# Patient Record
Sex: FEMALE | Race: BLACK OR AFRICAN AMERICAN | NOT HISPANIC OR LATINO | Employment: FULL TIME | URBAN - METROPOLITAN AREA
[De-identification: names, ages, dates, MRNs, and addresses within clinical notes are randomized per-mention and may not be internally consistent; named-entity substitution may affect disease eponyms.]

---

## 2017-03-14 ENCOUNTER — CONVERSION ENCOUNTER (OUTPATIENT)
Dept: GENERAL RADIOLOGY | Facility: HOSPITAL | Age: 42
End: 2017-03-14

## 2018-01-04 ENCOUNTER — OFFICE VISIT CONVERTED (OUTPATIENT)
Dept: ORTHOPEDIC SURGERY | Facility: CLINIC | Age: 43
End: 2018-01-04
Attending: PHYSICIAN ASSISTANT

## 2018-01-25 ENCOUNTER — OFFICE VISIT CONVERTED (OUTPATIENT)
Dept: ORTHOPEDIC SURGERY | Facility: CLINIC | Age: 43
End: 2018-01-25
Attending: PHYSICIAN ASSISTANT

## 2018-02-22 ENCOUNTER — OFFICE VISIT CONVERTED (OUTPATIENT)
Dept: ORTHOPEDIC SURGERY | Facility: CLINIC | Age: 43
End: 2018-02-22
Attending: PHYSICIAN ASSISTANT

## 2018-03-05 ENCOUNTER — OFFICE VISIT CONVERTED (OUTPATIENT)
Dept: FAMILY MEDICINE CLINIC | Facility: CLINIC | Age: 43
End: 2018-03-05
Attending: FAMILY MEDICINE

## 2018-03-05 ENCOUNTER — CONVERSION ENCOUNTER (OUTPATIENT)
Dept: FAMILY MEDICINE CLINIC | Facility: CLINIC | Age: 43
End: 2018-03-05

## 2018-03-22 ENCOUNTER — OFFICE VISIT CONVERTED (OUTPATIENT)
Dept: ORTHOPEDIC SURGERY | Facility: CLINIC | Age: 43
End: 2018-03-22
Attending: PHYSICIAN ASSISTANT

## 2018-03-29 ENCOUNTER — CONVERSION ENCOUNTER (OUTPATIENT)
Dept: FAMILY MEDICINE CLINIC | Facility: CLINIC | Age: 43
End: 2018-03-29

## 2018-03-29 ENCOUNTER — OFFICE VISIT CONVERTED (OUTPATIENT)
Dept: FAMILY MEDICINE CLINIC | Facility: CLINIC | Age: 43
End: 2018-03-29
Attending: FAMILY MEDICINE

## 2018-04-19 ENCOUNTER — OFFICE VISIT CONVERTED (OUTPATIENT)
Dept: NEUROLOGY | Facility: CLINIC | Age: 43
End: 2018-04-19
Attending: PSYCHIATRY & NEUROLOGY

## 2018-05-01 ENCOUNTER — OFFICE VISIT CONVERTED (OUTPATIENT)
Dept: ORTHOPEDIC SURGERY | Facility: CLINIC | Age: 43
End: 2018-05-01
Attending: PHYSICIAN ASSISTANT

## 2018-06-07 ENCOUNTER — OFFICE VISIT CONVERTED (OUTPATIENT)
Dept: ORTHOPEDIC SURGERY | Facility: CLINIC | Age: 43
End: 2018-06-07
Attending: PHYSICIAN ASSISTANT

## 2018-10-30 ENCOUNTER — OFFICE VISIT CONVERTED (OUTPATIENT)
Dept: FAMILY MEDICINE CLINIC | Facility: CLINIC | Age: 43
End: 2018-10-30
Attending: FAMILY MEDICINE

## 2018-11-08 ENCOUNTER — CONVERSION ENCOUNTER (OUTPATIENT)
Dept: FAMILY MEDICINE CLINIC | Facility: CLINIC | Age: 43
End: 2018-11-08

## 2018-11-08 ENCOUNTER — OFFICE VISIT CONVERTED (OUTPATIENT)
Dept: FAMILY MEDICINE CLINIC | Facility: CLINIC | Age: 43
End: 2018-11-08
Attending: FAMILY MEDICINE

## 2018-12-05 ENCOUNTER — OFFICE VISIT CONVERTED (OUTPATIENT)
Dept: OTOLARYNGOLOGY | Facility: CLINIC | Age: 43
End: 2018-12-05
Attending: OTOLARYNGOLOGY

## 2018-12-05 ENCOUNTER — CONVERSION ENCOUNTER (OUTPATIENT)
Dept: GENERAL RADIOLOGY | Facility: HOSPITAL | Age: 43
End: 2018-12-05

## 2018-12-10 ENCOUNTER — OFFICE VISIT CONVERTED (OUTPATIENT)
Dept: GASTROENTEROLOGY | Facility: CLINIC | Age: 43
End: 2018-12-10
Attending: NURSE PRACTITIONER

## 2019-01-23 ENCOUNTER — OFFICE VISIT CONVERTED (OUTPATIENT)
Dept: GASTROENTEROLOGY | Facility: CLINIC | Age: 44
End: 2019-01-23
Attending: NURSE PRACTITIONER

## 2019-02-14 ENCOUNTER — OFFICE VISIT CONVERTED (OUTPATIENT)
Dept: OTOLARYNGOLOGY | Facility: CLINIC | Age: 44
End: 2019-02-14
Attending: OTOLARYNGOLOGY

## 2019-03-13 ENCOUNTER — HOSPITAL ENCOUNTER (OUTPATIENT)
Dept: URGENT CARE | Facility: CLINIC | Age: 44
Discharge: HOME | End: 2019-03-13

## 2019-03-25 ENCOUNTER — CONVERSION ENCOUNTER (OUTPATIENT)
Dept: OTOLARYNGOLOGY | Facility: CLINIC | Age: 44
End: 2019-03-25

## 2019-03-25 ENCOUNTER — OFFICE VISIT CONVERTED (OUTPATIENT)
Dept: OTOLARYNGOLOGY | Facility: CLINIC | Age: 44
End: 2019-03-25
Attending: OTOLARYNGOLOGY

## 2019-05-22 ENCOUNTER — OFFICE VISIT CONVERTED (OUTPATIENT)
Dept: GASTROENTEROLOGY | Facility: CLINIC | Age: 44
End: 2019-05-22
Attending: NURSE PRACTITIONER

## 2019-06-13 ENCOUNTER — HOSPITAL ENCOUNTER (OUTPATIENT)
Dept: GASTROENTEROLOGY | Facility: HOSPITAL | Age: 44
Setting detail: HOSPITAL OUTPATIENT SURGERY
Discharge: HOME | End: 2019-06-13
Attending: INTERNAL MEDICINE

## 2019-09-13 ENCOUNTER — HOSPITAL ENCOUNTER (OUTPATIENT)
Dept: OTHER | Facility: HOSPITAL | Age: 44
Discharge: HOME | End: 2019-09-13
Attending: NURSE PRACTITIONER

## 2019-12-15 ENCOUNTER — HOSPITAL ENCOUNTER (OUTPATIENT)
Dept: URGENT CARE | Facility: CLINIC | Age: 44
Discharge: HOME | End: 2019-12-15
Attending: PHYSICIAN ASSISTANT

## 2019-12-20 ENCOUNTER — HOSPITAL ENCOUNTER (OUTPATIENT)
Dept: OTHER | Facility: HOSPITAL | Age: 44
Discharge: HOME | End: 2019-12-20
Attending: NURSE PRACTITIONER

## 2020-10-15 ENCOUNTER — CONVERSION ENCOUNTER (OUTPATIENT)
Dept: FAMILY MEDICINE CLINIC | Facility: CLINIC | Age: 45
End: 2020-10-15
Attending: FAMILY MEDICINE

## 2020-12-30 ENCOUNTER — HOSPITAL ENCOUNTER (OUTPATIENT)
Dept: OTHER | Facility: HOSPITAL | Age: 45
Discharge: HOME | End: 2020-12-30
Attending: NURSE PRACTITIONER

## 2021-01-14 ENCOUNTER — HOSPITAL ENCOUNTER (OUTPATIENT)
Dept: OTHER | Facility: HOSPITAL | Age: 46
Discharge: HOME | End: 2021-01-14
Attending: NURSE PRACTITIONER

## 2021-05-07 NOTE — PROGRESS NOTES
Progress Note      Patient Name: Javier Lua   Patient ID: [id number]   Sex: Female   YOB: 1975        Visit Date: March 29, 2018    Provider: Brian Mckinney MD   Location: [facility]   Location Address: [address] [place] [address]  [id number]   Location Phone: [phone]          Chief Complaint     cough, sore throat, sinus pressure, drainage, headache, stopped up ears, eyes hurt and pressure, sneezing. Did get better from last sickness earlier in month and then it came back this tuesday.       History Of Present Illness  Javier Lua is a 42 year old /Black female who presents for evaluation and treatment of:      cold symptoms x 2 days ago- sudden onset- worsening symptoms- otc meds not helping       Past Medical History  Disease Name Date Onset Notes   GERD (gastroesophageal reflux disease) --  --    Hypothyroid --  --    Seasonal allergies --  --          Past Surgical History  Procedure Name Date Notes   Cholecstectomy --  --    Hysterectomy --  --    Hysterectomy (due to cancer) --  --    Knee surgery --  --          Medication List  Name Date Started Instructions   levothyroxine 137 mcg oral tablet  take 1 tablet (137 mcg) by oral route once daily   Zantac 150 mg oral tablet  take 1 tablet (150 mg) by oral route 2 times per day         Allergy List  Allergen Name Date Reaction Notes   NO KNOWN DRUG ALLERGIES --  --  --          Family Medical History  Disease Name Relative/Age Notes   Anemia / Mother    Mother/    Arthrtis / Mother; Grandmother (maternal); Grandfather (maternal)    Grandfather (maternal)/     Grandmother (maternal)/     Mother/    Asthma / Mother    Mother/    DM Type II / Grandfather (maternal)    Grandfather (maternal)/    Hypertension / Mother; Grandmother (maternal)    Grandmother (maternal)/     Mother/          Social History  Finding Status Start/Stop Quantity Notes   Alcohol Former --/-- --  drank alcohol  in the past, 7 or less drinks per week   Exercises regularly --  --/-- --  1-2 times per week   Recreational Drug Use Never --/-- --  never used   Second hand smoke exposure Unknown --/-- --  yes   Tobacco Never --/-- --  never smoker, never uses other tobacco products   Uses seatbelts --  --/-- --  yes         Immunizations  NameDate Admin Mfg Trade Name Lot Number Route Inj VIS Given VIS Publication   Zcyfttdat84/15/2017 UNK Unknown TradeName [id number] NE NE 03/29/2018 08/07/2015   Comments:          Review of Systems  · Constitutional  o Admits  o : fever  o Denies  o : fatigue  · HENT  o Admits  o : sinus pain, nasal congestion, nasal discharge, sore throat, ear pain  · Cardiovascular  o Denies  o : chest pain, palpitations  · Respiratory  o Admits  o : cough  o Denies  o : shortness of breath  · Gastrointestinal  o Denies  o : nausea, vomiting, diarrhea      Vitals  Date Time BP Position Site L\R Cuff Size HR RR TEMP(F) WT  HT  BMI kg/m2 BSA m2 O2 Sat        03/29/2018 06:03 /80 Sitting    88 - R  98.8 248lbs 6oz    99 %           Physical Examination  · Constitutional  o Appearance  o : well developed, well-nourished, in no acute distress  · Head and Face  o HEENT  o : bilateral maxillary sinus tenderness, erythema of throat, uvula midline, throat open, no abscesses seen  · Respiratory  o Respiratory Effort  o : breathing unlabored  o Auscultation of Lungs  o : clear to auscultation  · Cardiovascular  o Heart  o :   § Auscultation of Heart  § : regular rate and rhythm  · Skin and Subcutaneous Tissue  o General Inspection  o : normal tone  · Neurologic  o Mental Status Examination  o :   § Orientation  § : grossly oriented to person, place and time  · Psychiatric  o General  o : normal affect and calm          Assessment  · Maxillary sinusitis     473.0/J32.0      Plan  · Orders  o ACO-39: Current medications updated and reviewed () - - 03/29/2018  o Influenza immunization was not ordered or  administered for reasons documented by clinician () - - 03/29/2018   recieved previously  · Medications  o cefdinir 300 mg oral capsule   SIG: take 1 capsule (300 mg) by oral route every 12 hours for 7 days   DISP: (14) capsules with 0 refills  Prescribed on 03/29/2018     o Probiotic 20 billion cell oral capsule   SIG: take 1 capsule by oral route 2 times a day for 10 days   DISP: (20) capsules with 0 refills  Prescribed on 03/29/2018     o Singulair 10 mg oral tablet   SIG: take 1 tablet (10 mg) by oral route once daily in the evening for 30 days   DISP: (30) tablets with 1 refills  Prescribed on 03/29/2018     o Medrol (Guillermo) 4 mg oral tablets,dose pack   SIG: take as directed for 5 days   DISP: (1) 21 ct dose-pack with 0 refills  Prescribed on 03/29/2018     · Instructions  o Patient was educated/instructed on their diagnosis, treatment and medications prior to discharge from the clinic today.            Electronically Signed by: rBian Mckinney MD -Author on March 29, 2018 06:24:04 PM

## 2021-05-07 NOTE — PROGRESS NOTES
Progress Note      Patient Name: Javier Lua   Patient ID: [id number]   Sex: Female   YOB: 1975        Visit Date: November 8, 2018    Provider: Brian Mckinney MD   Location: [facility]   Location Address: [address] [place] [address]  [id number]   Location Phone: [phone]          Chief Complaint     Still having the ear and teeth pain. went to dentist yesterday and told teeth are good.       History Of Present Illness  Javier Lua is a 42 year old /Black female who presents for evaluation and treatment of:      following up for ear pain- right side- meds not helping- seen dentist- not teeth- no better- worsening symptoms       Past Medical History  Disease Name Date Onset Notes   GERD (gastroesophageal reflux disease) --  --    Hypothyroid --  --    Seasonal allergies --  --          Past Surgical History  Procedure Name Date Notes   Cholecstectomy --  --    Hysterectomy --  --    Hysterectomy (due to cancer) --  --    Knee surgery --  --          Medication List  Name Date Started Instructions   levothyroxine 137 mcg oral tablet  take 1 tablet (137 mcg) by oral route once daily   ofloxacin 0.3 % otic (ear) drops 10/30/2018 instill 10 drops (1.5 mg) into affected ear(s) by otic route 2 times per day for 7 days   Singulair 10 mg oral tablet 10/30/2018 take 1 tablet (10 mg) by oral route once daily in the evening for 30 days   Zantac 150 mg oral tablet  take 1 tablet (150 mg) by oral route 2 times per day         Allergy List  Allergen Name Date Reaction Notes   NO KNOWN DRUG ALLERGIES --  --  --          Family Medical History  Disease Name Relative/Age Notes   Anemia / Mother    Mother/    Arthrtis / Mother; Grandmother (maternal); Grandfather (maternal)    Grandfather (maternal)/     Grandmother (maternal)/     Mother/    Asthma / Mother    Mother/    DM Type II / Grandfather (maternal)    Grandfather (maternal)/    Hypertension /  "Mother; Grandmother (maternal)    Grandmother (maternal)/     Mother/          Social History  Finding Status Start/Stop Quantity Notes   Alcohol Former --/-- --  drank alcohol in the past, 7 or less drinks per week   Exercises regularly --  --/-- --  1-2 times per week   Recreational Drug Use Never --/-- --  never used   Second hand smoke exposure Unknown --/-- --  yes   Tobacco Never --/-- --  never smoker, never uses other tobacco products   Uses seatbelts --  --/-- --  yes         Immunizations  NameDate Admin Mfg Trade Name Lot Number Route Inj VIS Given VIS Publication   Pfbecpilq35/10/2018 [facility] Fluzone Quadrivalent [id number] IM LA 10/10/2018 08/07/2015   Comments: 47435-642-03   Odctlsnqb74/15/2017 UNK Unknown TradeName [id number] NE NE 03/29/2018 08/07/2015   Comments:          Review of Systems  · Constitutional  o Denies  o : fatigue, fever  · HENT  o Admits  o : ear pain  o Denies  o : sinus pain, nasal congestion, nasal discharge, sore throat  · Cardiovascular  o Denies  o : chest pain, palpitations  · Respiratory  o Denies  o : shortness of breath, cough  · Gastrointestinal  o Denies  o : nausea, vomiting, diarrhea      Vitals  Date Time BP Position Site L\R Cuff Size HR RR TEMP(F) WT  HT  BMI kg/m2 BSA m2 O2 Sat        11/08/2018 07:08 /74 Sitting    84 - R  97.6 251lbs 2oz 5'  5\" 41.79 2.29 97 %           Physical Examination  · Constitutional  o Appearance  o : well developed, well-nourished, in no acute distress  · Head and Face  o HEENT  o : erythema of throat, uvula midline, throat open, no abscesses seen, left TM clear, right TM slightly cloudy  · Respiratory  o Respiratory Effort  o : breathing unlabored  o Auscultation of Lungs  o : clear to auscultation  · Cardiovascular  o Heart  o :   § Auscultation of Heart  § : regular rate and rhythm  · Skin and Subcutaneous Tissue  o General Inspection  o : normal tone  · Neurologic  o Mental Status Examination  o :   § Orientation  § : grossly " oriented to person, place and time  · Psychiatric  o General  o : normal affect and calm          Assessment  · Hypothyroidism     244.9/E03.9  · Otitis media, right     382.9/H66.91      Plan  · Orders  o IOP - CBC (03848) - 382.9/H66.91 - 11/08/2018  o TSH [facility] (26879) - 382.9/H66.91, 244.9/E03.9 - 11/08/2018  o ACO-39: Current medications updated and reviewed () - - 11/08/2018  o ENT CONSULTATION ([facility]) - 382.9/H66.91 - 11/08/2018  · Medications  o clindamycin HCl 300 mg oral capsule   SIG: take 1 capsule (300 mg) by oral route every 6 hours for 7 days   DISP: (28) capsules with 0 refills  Prescribed on 11/08/2018     o Probiotic 20 billion cell oral capsule   SIG: take 1 capsule by oral route 2 times a day for 10 days   DISP: (20) capsules with 0 refills  Prescribed on 11/08/2018     o Medrol (Guillermo) 4 mg oral tablets,dose pack   SIG: take as directed for 5 days   DISP: (1) 21 ct dose-pack with 0 refills  Prescribed on 11/08/2018     o ofloxacin 0.3 % otic (ear) drops   SIG: instill 10 drops (1.5 mg) into affected ear(s) by otic route 2 times per day for 7 days   DISP: (1) 10 ml drop btl with 0 refills  Discontinued on 11/08/2018     · Instructions  o Patient was educated/instructed on their diagnosis, treatment and medications prior to discharge from the clinic today.            Electronically Signed by: Brian Mckinney MD -Author on November 8, 2018 07:59:15 PM

## 2021-05-07 NOTE — PROGRESS NOTES
Progress Note      Patient Name: Javier Lua   Patient ID: [id number]   Sex: Female   YOB: 1975        Visit Date: October 30, 2018    Provider: Brian Mckinney MD   Location: [facility]   Location Address: [address] [place] [address]  [id number]   Location Phone: [phone]          Chief Complaint     right ear pain off and on 2 weeks.       History Of Present Illness  Javier Lua is a 42 year old /Black female who presents for evaluation and treatment of:      cold symptoms x 2 weeks- sudden onset- worsening symptoms- right ear pain       Past Medical History  Disease Name Date Onset Notes   GERD (gastroesophageal reflux disease) --  --    Hypothyroid --  --    Seasonal allergies --  --          Past Surgical History  Procedure Name Date Notes   Cholecstectomy --  --    Hysterectomy --  --    Hysterectomy (due to cancer) --  --    Knee surgery --  --          Medication List  Name Date Started Instructions   levothyroxine 137 mcg oral tablet  take 1 tablet (137 mcg) by oral route once daily   Singulair 10 mg oral tablet 03/29/2018 take 1 tablet (10 mg) by oral route once daily in the evening for 30 days   Zantac 150 mg oral tablet  take 1 tablet (150 mg) by oral route 2 times per day         Allergy List  Allergen Name Date Reaction Notes   NO KNOWN DRUG ALLERGIES --  --  --          Family Medical History  Disease Name Relative/Age Notes   Anemia / Mother    Mother/    Arthrtis / Mother; Grandmother (maternal); Grandfather (maternal)    Grandfather (maternal)/     Grandmother (maternal)/     Mother/    Asthma / Mother    Mother/    DM Type II / Grandfather (maternal)    Grandfather (maternal)/    Hypertension / Mother; Grandmother (maternal)    Grandmother (maternal)/     Mother/          Social History  Finding Status Start/Stop Quantity Notes   Alcohol Former --/-- --  drank alcohol in the past, 7 or less drinks per week   Exercises  regularly --  --/-- --  1-2 times per week   Recreational Drug Use Never --/-- --  never used   Second hand smoke exposure Unknown --/-- --  yes   Tobacco Never --/-- --  never smoker, never uses other tobacco products   Uses seatbelts --  --/-- --  yes         Immunizations  NameDate Admin Mfg Trade Name Lot Number Route Inj VIS Given VIS Publication   Oexncwyuv76/10/2018 [facility] Fluzone Quadrivalent [id number] IM LA 10/10/2018 08/07/2015   Comments: 51831-832-24   Iaacqjxtz38/15/2017 UNK Unknown TradeName [id number] NE NE 03/29/2018 08/07/2015   Comments:          Review of Systems  · Constitutional  o Admits  o : fever  o Denies  o : fatigue  · HENT  o Admits  o : ear pain  o Denies  o : nasal congestion, nasal discharge, sore throat  · Cardiovascular  o Denies  o : chest pain, palpitations  · Respiratory  o Denies  o : shortness of breath, cough  · Integument  o Denies  o : rash      Vitals  Date Time BP Position Site L\R Cuff Size HR RR TEMP(F) WT  HT  BMI kg/m2 BSA m2 O2 Sat HC       10/30/2018 06:21 /68 Sitting    88 - R  97.8 252lbs 2oz    98 %           Physical Examination  · Constitutional  o Appearance  o : well developed, well-nourished, in no acute distress  · Ears, Nose, Mouth and Throat  o Ears  o :   § Otoscopic Examination  § : bilateral TM's cloudy, red  o Throat  o :   § Oropharynx  § : no erythema of throat  · Respiratory  o Respiratory Effort  o : breathing unlabored  o Auscultation of Lungs  o : clear to auscultation  · Cardiovascular  o Heart  o :   § Auscultation of Heart  § : regular rate and rhythm  · Skin and Subcutaneous Tissue  o General Inspection  o : normal tone  · Neurologic  o Mental Status Examination  o :   § Orientation  § : grossly oriented to person, place and time  · Psychiatric  o General  o : normal affect and calm          Assessment  · Otitis media     382.9/H66.90      Plan  · Orders  o ACO-39: Current medications updated and reviewed () - -  10/30/2018  · Medications  o cefdinir 300 mg oral capsule   SIG: take 1 capsule (300 mg) by oral route every 12 hours for 7 days   DISP: (14) capsules with 0 refills  Prescribed on 10/30/2018     o Probiotic 20 billion cell oral capsule   SIG: take 1 capsule by oral route 2 times a day for 10 days   DISP: (20) capsules with 0 refills  Prescribed on 10/30/2018     o ofloxacin 0.3 % otic (ear) drops   SIG: instill 10 drops (1.5 mg) into affected ear(s) by otic route 2 times per day for 7 days   DISP: (1) 10 ml drop btl with 0 refills  Prescribed on 10/30/2018     o Singulair 10 mg oral tablet   SIG: take 1 tablet (10 mg) by oral route once daily in the evening for 30 days   DISP: (30) tablets with 5 refills  Adjusted on 10/30/2018     · Instructions  o Patient was educated/instructed on their diagnosis, treatment and medications prior to discharge from the clinic today.            Electronically Signed by: Brian Mckinney MD -Author on October 30, 2018 06:29:56 PM

## 2021-05-07 NOTE — PROGRESS NOTES
Quick Note      Patient Name: Javier Lua   Patient ID: [id number]   Sex: Female   YOB: 1975        Visit Date: October 15, 2020    Provider: Paulino Hairston DO   Location: [facility]   Location Address: [address] [place] [address] [place]  [id number]   Location Phone: [phone]          History Of Present Illness  Javier Lua presents today for a flu vaccination. The 2538-2722. Seasonal Influenza (Flu) Vaccine Adminstration Record has been completed and scanned into the chart.           Assessment  · Need for immunization against influenza     V04.81/Z23      Plan  · Orders  o Immunization Admin Fee (Single) ([facility]) (32687) - V04.81/Z23 - 10/15/2020  o Fluarix QUADRIVALENT Vaccine, Syringe, Latex Free, Preservative Free, age 6 months and up (91958) - V04.81/Z23 - 10/15/2020   RT ARM LOT#[id number] EXP:6-30-21 NDC [id number]            Electronically Signed by: Oneyda Young MA -Author on October 15, 2020 10:57:44 AM

## 2021-05-07 NOTE — PROGRESS NOTES
Progress Note      Patient Name: Javier Lua   Patient ID: [id number]   Sex: Female   YOB: 1975        Visit Date: March 5, 2018    Provider: Brian Mckinney MD   Location: [facility]   Location Address: [address] [place] [address]  [id number]   Location Phone: [phone]          Chief Complaint     patient c/o a cough x 1 month       History Of Present Illness  Javier Lua is a 42 year old /Black female who presents for evaluation and treatment of:      cough x 1-2 months- sudden onset- worsening symptoms- otc meds not helping    xrays:NAD       Past Medical History  Disease Name Date Onset Notes   GERD (gastroesophageal reflux disease) --  --    Hypothyroid --  --    Seasonal allergies --  --          Past Surgical History  Procedure Name Date Notes   Cholecstectomy --  --    Hysterectomy --  --    Hysterectomy (due to cancer) --  --    Knee surgery --  --          Medication List  Name Date Started Instructions   levothyroxine 137 mcg oral tablet  take 1 tablet (137 mcg) by oral route once daily   Zantac 150 mg oral tablet  take 1 tablet (150 mg) by oral route 2 times per day         Allergy List  Allergen Name Date Reaction Notes   NO KNOWN DRUG ALLERGIES --  --  --          Family Medical History  Disease Name Relative/Age Notes   Anemia / Mother    Mother/    Arthrtis / Mother; Grandmother (maternal); Grandfather (maternal)    Grandfather (maternal)/     Grandmother (maternal)/     Mother/    Asthma / Mother    Mother/    DM Type II / Grandfather (maternal)    Grandfather (maternal)/    Hypertension / Mother; Grandmother (maternal)    Grandmother (maternal)/     Mother/          Social History  Finding Status Start/Stop Quantity Notes   Alcohol Former --/-- --  drank alcohol in the past, 7 or less drinks per week   Exercises regularly --  --/-- --  1-2 times per week   Recreational Drug Use Never --/-- --  never used   Second hand  smoke exposure Unknown --/-- --  yes   Tobacco Never --/-- --  never smoker, never uses other tobacco products   Uses seatbelts --  --/-- --  yes         Review of Systems  · Constitutional  o Denies  o : fatigue, fever  · HENT  o Admits  o : nasal congestion, nasal discharge, sore throat  · Cardiovascular  o Denies  o : chest pain, palpitations  · Respiratory  o Admits  o : cough  o Denies  o : shortness of breath  · Gastrointestinal  o Denies  o : nausea, vomiting, diarrhea      Vitals  Date Time BP Position Site L\R Cuff Size HR RR TEMP(F) WT  HT  BMI kg/m2 BSA m2 O2 Sat HC       03/05/2018 06:09 /78 Sitting    98 - R  97 246lbs 0oz    92 %           Physical Examination  · Constitutional  o Appearance  o : well developed, well-nourished, in no acute distress  · Ears, Nose, Mouth and Throat  o Ears  o :   § Otoscopic Examination  § : tympanic membrane appearance within normal limits bilaterally  o Throat  o :   § Oropharynx  § : erythema of throat, uvula midline, throat open, no abscesses seen  · Respiratory  o Respiratory Effort  o : breathing unlabored  o Auscultation of Lungs  o : clear to auscultation  · Cardiovascular  o Heart  o :   § Auscultation of Heart  § : regular rate and rhythm  · Skin and Subcutaneous Tissue  o General Inspection  o : normal tone  · Psychiatric  o General  o : normal affect and calm              Assessment  · Cough     786.2/R05  · URI (upper respiratory infection)     465.9/J06.9      Plan  · Orders  o ACO-39: Current medications updated and reviewed () - - 03/05/2018  o Xray chest 2 views [facility] (75397) - 786.2/R05 - 03/05/2018  · Medications  o azithromycin 250 mg oral tablet   SIG: take 2 tablets (500 mg) by oral route once daily for 1 day then 1 tablet (250 mg) by oral route once daily for 4 days   DISP: (6) tablets with 0 refills  Prescribed on 03/05/2018     o prednisone 20 mg oral tablet   SIG: take 3 tablets by oral route daily for 5 days   DISP: (15)  tablets with 0 refills  Prescribed on 03/05/2018     o Singulair 10 mg oral tablet   SIG: take 1 tablet (10 mg) by oral route once daily in the evening for 14 days   DISP: (14) tablets with 0 refills  Prescribed on 03/05/2018     · Instructions  o Patient was educated/instructed on their diagnosis, treatment and medications prior to discharge from the clinic today.            Electronically Signed by: Brian Mckinney MD -Author on March 5, 2018 06:47:33 PM

## 2021-05-09 VITALS
HEIGHT: 65 IN | SYSTOLIC BLOOD PRESSURE: 116 MMHG | WEIGHT: 251.12 LBS | HEART RATE: 84 BPM | TEMPERATURE: 97.6 F | BODY MASS INDEX: 41.84 KG/M2 | DIASTOLIC BLOOD PRESSURE: 74 MMHG | OXYGEN SATURATION: 97 %

## 2021-05-09 VITALS
SYSTOLIC BLOOD PRESSURE: 110 MMHG | OXYGEN SATURATION: 98 % | DIASTOLIC BLOOD PRESSURE: 68 MMHG | WEIGHT: 252.12 LBS | TEMPERATURE: 97.8 F | HEART RATE: 88 BPM

## 2021-05-09 VITALS
DIASTOLIC BLOOD PRESSURE: 78 MMHG | WEIGHT: 246 LBS | OXYGEN SATURATION: 92 % | SYSTOLIC BLOOD PRESSURE: 124 MMHG | HEART RATE: 98 BPM | TEMPERATURE: 97 F

## 2021-05-09 VITALS
OXYGEN SATURATION: 99 % | WEIGHT: 248.37 LBS | TEMPERATURE: 98.8 F | SYSTOLIC BLOOD PRESSURE: 122 MMHG | HEART RATE: 88 BPM | DIASTOLIC BLOOD PRESSURE: 80 MMHG

## 2021-05-15 VITALS
WEIGHT: 230.5 LBS | BODY MASS INDEX: 38.4 KG/M2 | SYSTOLIC BLOOD PRESSURE: 116 MMHG | HEIGHT: 65 IN | DIASTOLIC BLOOD PRESSURE: 67 MMHG

## 2021-05-15 VITALS
BODY MASS INDEX: 39.99 KG/M2 | DIASTOLIC BLOOD PRESSURE: 61 MMHG | HEIGHT: 65 IN | SYSTOLIC BLOOD PRESSURE: 120 MMHG | RESPIRATION RATE: 16 BRPM | HEART RATE: 79 BPM | TEMPERATURE: 99.7 F | OXYGEN SATURATION: 100 % | WEIGHT: 240 LBS

## 2021-05-16 VITALS — OXYGEN SATURATION: 98 % | WEIGHT: 246 LBS | HEART RATE: 77 BPM | BODY MASS INDEX: 40.98 KG/M2 | HEIGHT: 65 IN

## 2021-05-16 VITALS — HEART RATE: 84 BPM | OXYGEN SATURATION: 99 % | BODY MASS INDEX: 39.82 KG/M2 | WEIGHT: 239 LBS | HEIGHT: 65 IN

## 2021-05-16 VITALS — WEIGHT: 238 LBS | BODY MASS INDEX: 39.65 KG/M2 | HEART RATE: 78 BPM | HEIGHT: 65 IN | OXYGEN SATURATION: 98 %

## 2021-05-16 VITALS — WEIGHT: 239 LBS | BODY MASS INDEX: 39.82 KG/M2 | OXYGEN SATURATION: 99 % | HEART RATE: 85 BPM | HEIGHT: 65 IN

## 2021-05-16 VITALS
BODY MASS INDEX: 40.07 KG/M2 | RESPIRATION RATE: 16 BRPM | HEART RATE: 86 BPM | HEIGHT: 65 IN | WEIGHT: 240.5 LBS | SYSTOLIC BLOOD PRESSURE: 134 MMHG | OXYGEN SATURATION: 99 % | TEMPERATURE: 97.9 F | DIASTOLIC BLOOD PRESSURE: 82 MMHG

## 2021-05-16 VITALS — WEIGHT: 247.37 LBS | OXYGEN SATURATION: 98 % | HEART RATE: 76 BPM | HEIGHT: 65 IN | BODY MASS INDEX: 41.22 KG/M2

## 2021-05-16 VITALS
BODY MASS INDEX: 41.36 KG/M2 | OXYGEN SATURATION: 99 % | SYSTOLIC BLOOD PRESSURE: 124 MMHG | DIASTOLIC BLOOD PRESSURE: 69 MMHG | WEIGHT: 248.25 LBS | RESPIRATION RATE: 16 BRPM | HEART RATE: 73 BPM | TEMPERATURE: 98.6 F | HEIGHT: 65 IN

## 2021-05-16 VITALS
BODY MASS INDEX: 40.38 KG/M2 | WEIGHT: 242.37 LBS | HEIGHT: 65 IN | SYSTOLIC BLOOD PRESSURE: 119 MMHG | DIASTOLIC BLOOD PRESSURE: 70 MMHG

## 2021-05-16 VITALS
DIASTOLIC BLOOD PRESSURE: 67 MMHG | WEIGHT: 247 LBS | BODY MASS INDEX: 41.15 KG/M2 | SYSTOLIC BLOOD PRESSURE: 118 MMHG | HEIGHT: 65 IN

## 2021-05-16 VITALS — HEIGHT: 65 IN | WEIGHT: 272 LBS | BODY MASS INDEX: 45.32 KG/M2 | OXYGEN SATURATION: 99 % | HEART RATE: 92 BPM

## 2022-01-26 ENCOUNTER — TRANSCRIBE ORDERS (OUTPATIENT)
Dept: ADMINISTRATIVE | Facility: HOSPITAL | Age: 47
End: 2022-01-26

## 2022-01-26 DIAGNOSIS — E05.91 THYROTOXICOSIS WITH THYROTOXIC CRISIS, UNSPECIFIED THYROTOXICOSIS TYPE: Primary | ICD-10-CM

## 2022-01-31 ENCOUNTER — APPOINTMENT (OUTPATIENT)
Dept: ULTRASOUND IMAGING | Facility: HOSPITAL | Age: 47
End: 2022-01-31

## 2022-02-01 ENCOUNTER — HOSPITAL ENCOUNTER (OUTPATIENT)
Dept: ULTRASOUND IMAGING | Facility: HOSPITAL | Age: 47
Discharge: HOME | End: 2022-02-01
Admitting: NURSE PRACTITIONER

## 2022-02-01 DIAGNOSIS — E05.91 THYROTOXICOSIS WITH THYROTOXIC CRISIS, UNSPECIFIED THYROTOXICOSIS TYPE: ICD-10-CM

## 2022-02-01 PROCEDURE — 76536 US EXAM OF HEAD AND NECK: CPT

## 2022-06-20 DIAGNOSIS — N89.8 VAGINAL DRYNESS: Primary | ICD-10-CM

## 2022-06-20 RX ORDER — ESTRADIOL 10 UG/1
INSERT VAGINAL
Qty: 38 TABLET | Refills: 0 | Status: SHIPPED | OUTPATIENT
Start: 2022-06-20 | End: 2023-02-02

## 2023-01-25 RX ORDER — LEVOTHYROXINE SODIUM 0.15 MG/1
TABLET ORAL
COMMUNITY
End: 2023-02-02

## 2023-01-25 RX ORDER — LEVOTHYROXINE SODIUM 0.1 MG/1
TABLET ORAL
COMMUNITY
Start: 2022-12-16

## 2023-02-02 ENCOUNTER — OFFICE VISIT (OUTPATIENT)
Dept: ENDOCRINOLOGY | Facility: CLINIC | Age: 48
End: 2023-02-02
Payer: COMMERCIAL

## 2023-02-02 ENCOUNTER — PATIENT ROUNDING (BHMG ONLY) (OUTPATIENT)
Dept: ENDOCRINOLOGY | Facility: CLINIC | Age: 48
End: 2023-02-02
Payer: COMMERCIAL

## 2023-02-02 VITALS
WEIGHT: 264 LBS | SYSTOLIC BLOOD PRESSURE: 122 MMHG | OXYGEN SATURATION: 96 % | HEIGHT: 65 IN | BODY MASS INDEX: 43.99 KG/M2 | HEART RATE: 69 BPM | DIASTOLIC BLOOD PRESSURE: 80 MMHG

## 2023-02-02 DIAGNOSIS — E03.9 ACQUIRED HYPOTHYROIDISM: ICD-10-CM

## 2023-02-02 DIAGNOSIS — E04.1 THYROID NODULE: Primary | ICD-10-CM

## 2023-02-02 PROBLEM — H91.90 HEARING LOSS: Status: ACTIVE | Noted: 2023-02-02

## 2023-02-02 PROBLEM — E11.9 DIABETES (HCC): Status: ACTIVE | Noted: 2023-02-02

## 2023-02-02 PROBLEM — M19.90 ARTHRITIS: Status: ACTIVE | Noted: 2023-02-02

## 2023-02-02 PROBLEM — J30.2 SEASONAL ALLERGIC RHINITIS: Status: ACTIVE | Noted: 2023-02-02

## 2023-02-02 PROBLEM — K21.9 GERD (GASTROESOPHAGEAL REFLUX DISEASE): Status: ACTIVE | Noted: 2023-02-02

## 2023-02-02 PROBLEM — K52.3 INDETERMINATE COLITIS: Status: ACTIVE | Noted: 2023-02-02

## 2023-02-02 PROBLEM — S83.249A TEAR OF MEDIAL MENISCUS OF KNEE: Status: ACTIVE | Noted: 2017-07-28

## 2023-02-02 PROBLEM — M22.8X1 PATELLAR MALTRACKING, RIGHT: Status: ACTIVE | Noted: 2017-07-28

## 2023-02-02 PROBLEM — K21.9 ESOPHAGEAL REFLUX: Status: ACTIVE | Noted: 2023-02-02

## 2023-02-02 PROBLEM — M79.18 MYOFASCIAL MUSCLE PAIN: Status: ACTIVE | Noted: 2023-02-02

## 2023-02-02 PROBLEM — H93.11 TINNITUS OF RIGHT EAR: Status: ACTIVE | Noted: 2023-02-02

## 2023-02-02 PROBLEM — K76.0 FATTY LIVER: Status: ACTIVE | Noted: 2023-02-02

## 2023-02-02 PROBLEM — H92.01 OTALGIA OF RIGHT EAR: Status: ACTIVE | Noted: 2023-02-02

## 2023-02-02 PROBLEM — R51.9 FACIAL PAIN: Status: ACTIVE | Noted: 2023-02-02

## 2023-02-02 PROCEDURE — 99204 OFFICE O/P NEW MOD 45 MIN: CPT | Performed by: INTERNAL MEDICINE

## 2023-02-02 NOTE — PATIENT INSTRUCTIONS
Continue current dose of levothyroxine  Check TSH and free T4 with TPO antibodies in 3 months  Arrange for thyroid ultrasound in September 2023.

## 2023-02-02 NOTE — PROGRESS NOTES
Endocrine Consult Outpatient  Referred by Mrs. Denise Chávez for thyroid evaluation  Patient Care Team:  Betsy Muro APRN as PCP - General (Nurse Practitioner)     Chief Complaint: Thyroid evaluation        HPI: This is a 47-year-old female who was initially diagnosed with hypothyroidism in 1996 after she had her baby and she has been on levothyroxine ever since that.  She tells me that recently her dose has been reduced and she is currently taking levothyroxine 100 mcg 6 days a week and half a tablet 1 day a week.  She has been having trouble swallowing so she had a thyroid ultrasound done back in February 2022 which did show a small 0.6 cm node in the left side.  She subsequently had a repeat ultrasound done in September 2022 which also showed a small heterogeneous thyroid may be a small nodule.    There is no family history of thyroid cancer and no history of radiation exposure.    Past Medical History:   Diagnosis Date   • Disease of thyroid gland    • Hypothyroid        Social History     Socioeconomic History   • Marital status: Single   • Number of children: 3   • Years of education: 12   Tobacco Use   • Smoking status: Never   • Smokeless tobacco: Never   Vaping Use   • Vaping Use: Never used   Substance and Sexual Activity   • Alcohol use: Yes     Comment: occ   • Drug use: Never   • Sexual activity: Defer       Family History   Problem Relation Age of Onset   • Hypertension Mother    • Ulcers Father    • Thyroid disease Maternal Aunt    • Cancer Maternal Grandmother         stomach   • Hypertension Maternal Grandmother    • Diabetes Maternal Grandmother    • Heart disease Maternal Grandmother    • Hypertension Maternal Grandfather    • Stroke Maternal Grandfather    • Heart disease Maternal Grandfather    • COPD Maternal Grandfather        Allergies   Allergen Reactions   • Latex Hives and Itching       ROS:   Constitutional:  Admit fatigue, tiredness.    Eyes:  Denies change in visual acuity    HENT:  Denies nasal congestion or sore throat   Respiratory: denies cough, shortness of breath.   Cardiovascular:  denies chest pain, edema   GI:  Denies abdominal pain, nausea, vomiting.    :  Denies dysuria   Musculoskeletal:  Denies back pain or joint pain   Integument:  Admit dry skin  Neurologic:  Denies headache, focal weakness or sensory changes   Endocrine:  Denies polyuria or polydipsia   Psychiatric:  Denies depression or anxiety      Vitals:    02/02/23 1326   BP: 122/80   Pulse: 69   SpO2: 96%     Body mass index is 43.93 kg/m².      Physical Exam:  GEN: NAD, conversant  EYES: EOMI, PERRL, no conjunctival erythema  NECK: no thyromegaly, full ROM   CV: RRR, no murmurs/rubs/gallops, no peripheral edema  LUNG: CTAB, no wheezes/rales/ronchi  SKIN: no rashes, no acanthosis  MSK: no deformities, full ROM of all extremities  NEURO: no tremors, DTR normal  PSYCH: AOX3, appropriate mood, affect normal      Results Review:     I reviewed the patient's new clinical results.    Lab Results   Component Value Date    GLUCOSE 143 (H) 04/22/2020    BUN 6 04/22/2020    CREATININE 0.86 04/22/2020    BCR 7 04/22/2020    K see below 04/22/2020    CO2 21 (L) 04/22/2020    CALCIUM 8.6 (L) 04/22/2020    ALBUMIN 4.1 04/22/2020    LABIL2 1.5 04/22/2020    AST see below 04/22/2020    ALT see below 04/22/2020       Lab Results   Component Value Date    CREATININE 0.86 04/22/2020     Labs from December 2022 showed a TSH of 0.12.  This was reviewed from her phone.    US thyroid (09/07/2022 15:35)   US Thyroid (02/01/2022 13:48)       Medication Review: Reviewed.       Current Outpatient Medications:   •  albuterol sulfate  (90 Base) MCG/ACT inhaler, Every 4 (Four) Hours., Disp: , Rfl:   •  cholecalciferol (VITAMIN D3) 1.25 MG (76352 UT) capsule, cholecalciferol (vitamin D3) 1,250 mcg (50,000 unit) capsule  Take 1 capsule twice a week by oral route for 30 days., Disp: , Rfl:   •  ipratropium-albuterol (DUO-NEB) 0.5-2.5  mg/3 ml nebulizer, ipratropium 0.5 mg-albuterol 3 mg (2.5 mg base)/3 mL nebulization soln, Disp: , Rfl:   •  levothyroxine (SYNTHROID, LEVOTHROID) 100 MCG tablet, , Disp: , Rfl:   •  montelukast (SINGULAIR) 10 MG tablet, montelukast 10 mg tablet  TAKE 1 TABLET BY MOUTH EVERY DAY, Disp: , Rfl:   •  pantoprazole (PROTONIX) 40 MG EC tablet, , Disp: , Rfl:     Assessment and plan:  Thyroid Nodule: Has small nodule on left side, stable Too small for bx. Recommend f/u Thyroid US in 1 year.     Hypothyroidism: On Levothyroxine 100 mcg 1 tab po daily, 6 days and 1/2 tab 1 day  A week. Follow labs.         Carleen Sandoval MD FACE.

## 2023-02-02 NOTE — PROGRESS NOTES
February 2, 2023    Hello, may I speak with Javier Lua?    My name is Sravani    I am  with [facility]  [address] [place] [id number].    Before we get started may I verify your date of birth? 1975    I am calling to officially welcome you to our practice and ask about your recent visit. Is this a good time to talk? yes    Tell me about your visit with us. What things went well?  2nd opinion about thyroid       We're always looking for ways to make our patients' experiences even better. Do you have recommendations on ways we may improve?  no    Overall were you satisfied with your first visit to our practice? yes       I appreciate you taking the time to speak with me today. Is there anything else I can do for you? no      Thank you, and have a great day.

## 2023-05-04 ENCOUNTER — OFFICE VISIT (OUTPATIENT)
Dept: OBSTETRICS AND GYNECOLOGY | Facility: CLINIC | Age: 48
End: 2023-05-04
Payer: COMMERCIAL

## 2023-05-04 VITALS
HEART RATE: 67 BPM | HEIGHT: 65 IN | SYSTOLIC BLOOD PRESSURE: 127 MMHG | DIASTOLIC BLOOD PRESSURE: 90 MMHG | BODY MASS INDEX: 44.98 KG/M2 | WEIGHT: 270 LBS

## 2023-05-04 DIAGNOSIS — N95.2 ATROPHIC VAGINITIS: Primary | ICD-10-CM

## 2023-05-04 PROCEDURE — 99214 OFFICE O/P EST MOD 30 MIN: CPT | Performed by: OBSTETRICS & GYNECOLOGY

## 2023-05-04 RX ORDER — ESTRADIOL 0.1 MG/G
CREAM VAGINAL
Qty: 42.5 G | Refills: 2 | Status: SHIPPED | OUTPATIENT
Start: 2023-05-04

## 2023-05-04 NOTE — PROGRESS NOTES
"GYN Visit    Chief Complaint   Patient presents with   • Follow-up     Follow up vaginal dryness        HPI:   47 y.o. LMP: No LMP recorded. Patient has had a hysterectomy.     Social History     Substance and Sexual Activity   Sexual Activity Yes   • Partners: Male   • Birth control/protection: Hysterectomy       46 y/o  s/p TLH here for f/u vaginal dryness.  Pt c/o skin being dry all over. She is also having trouble with her hair falling out. She was tried on vagifem but states vaginal dryness persisted.    History: PMHx, Meds, Allergies, PSHx, Social Hx, and POBHx all reviewed and updated.    PHYSICAL EXAM:  /90   Pulse 67   Ht 165.1 cm (65\")   Wt 122 kg (270 lb)   BMI 44.93 kg/m²   General- NAD, alert and oriented, appropriate  Psych- Normal mood, good memory        External genitalia- Normal female, no lesions  Urethra/meatus- Normal, no masses, non tender, no prolapse  Bladder- Normal, no masses, non tender, no prolapse  Vagina- Normal, no atrophy, no lesions, no discharge, no prolapse  Cervix- Absent  Uterus- Absent   Adnexa- No mass, non tender  Anus/Rectum/Perineum- Not performed    Lymphatic- No palpable groin nodes  Extremities- No edema, no cyanosis    Skin- No lesions, no rashes      ASSESSMENT AND PLAN:  Diagnoses and all orders for this visit:    1. Atrophic vaginitis (Primary)  -     estradiol (ESTRACE VAGINAL) 0.1 MG/GM vaginal cream; Use 2 gms q hs x 14 then 2x per week  Dispense: 42.5 g; Refill: 2          Follow Up:  No follow-ups on file.          Ananya Anna DO  2023    [facility]  [address] [place] [address]  Dept: [phone]  Dept Fax: [phone]  Loc: [phone]  Loc Fax: [phone]     "

## 2024-02-22 DIAGNOSIS — N95.2 ATROPHIC VAGINITIS: ICD-10-CM

## 2024-02-22 RX ORDER — ESTRADIOL 0.1 MG/G
CREAM VAGINAL
Qty: 42.5 G | Refills: 1 | Status: SHIPPED | OUTPATIENT
Start: 2024-02-22

## 2024-02-22 NOTE — TELEPHONE ENCOUNTER
Refill request for Estradiol 0.1 mg/gm vaginal cream.  The pt was last seen on 05/04/23.  Per protocol, I have sent in 2 refills for the patient.  She will need to schedule her annual appointment which is due 05/05/24.  I have sent the pt a [facility] message letting her know of her refills and that she needs to schedule with Dr. Anna.

## 2024-04-10 ENCOUNTER — APPOINTMENT (OUTPATIENT)
Dept: CT IMAGING | Facility: HOSPITAL | Age: 49
End: 2024-04-10
Payer: COMMERCIAL

## 2024-04-10 ENCOUNTER — HOSPITAL ENCOUNTER (EMERGENCY)
Facility: HOSPITAL | Age: 49
Discharge: HOME | End: 2024-04-10
Attending: EMERGENCY MEDICINE | Admitting: EMERGENCY MEDICINE
Payer: COMMERCIAL

## 2024-04-10 ENCOUNTER — APPOINTMENT (OUTPATIENT)
Dept: GENERAL RADIOLOGY | Facility: HOSPITAL | Age: 49
End: 2024-04-10
Payer: COMMERCIAL

## 2024-04-10 VITALS
HEIGHT: 65 IN | SYSTOLIC BLOOD PRESSURE: 156 MMHG | RESPIRATION RATE: 18 BRPM | WEIGHT: 275.57 LBS | TEMPERATURE: 98 F | OXYGEN SATURATION: 100 % | BODY MASS INDEX: 45.91 KG/M2 | DIASTOLIC BLOOD PRESSURE: 90 MMHG | HEART RATE: 83 BPM

## 2024-04-10 DIAGNOSIS — M54.2 NECK PAIN: Primary | ICD-10-CM

## 2024-04-10 DIAGNOSIS — R10.32 LEFT LOWER QUADRANT PAIN: ICD-10-CM

## 2024-04-10 LAB
ALBUMIN SERPL-MCNC: 4.5 G/DL (ref 3.5–5.2)
ALBUMIN/GLOB SERPL: 1.7 G/DL
ALP SERPL-CCNC: 68 U/L (ref 39–117)
ALT SERPL W P-5'-P-CCNC: 39 U/L (ref 1–33)
ANION GAP SERPL CALCULATED.3IONS-SCNC: 12.7 MMOL/L (ref 5–15)
AST SERPL-CCNC: 33 U/L (ref 1–32)
BASOPHILS # BLD AUTO: 0.04 10*3/MM3 (ref 0–0.2)
BASOPHILS NFR BLD AUTO: 0.7 % (ref 0–1.5)
BILIRUB SERPL-MCNC: 0.5 MG/DL (ref 0–1.2)
BILIRUB UR QL STRIP: NEGATIVE
BUN SERPL-MCNC: 8 MG/DL (ref 6–20)
BUN/CREAT SERPL: 8.1 (ref 7–25)
CALCIUM SPEC-SCNC: 9.4 MG/DL (ref 8.6–10.5)
CHLORIDE SERPL-SCNC: 102 MMOL/L (ref 98–107)
CLARITY UR: CLEAR
CO2 SERPL-SCNC: 24.3 MMOL/L (ref 22–29)
COLOR UR: YELLOW
CREAT SERPL-MCNC: 0.99 MG/DL (ref 0.57–1)
D-LACTATE SERPL-SCNC: 1.4 MMOL/L (ref 0.5–2)
DEPRECATED RDW RBC AUTO: 43.1 FL (ref 37–54)
EGFRCR SERPLBLD CKD-EPI 2021: 69.6 ML/MIN/1.73
EOSINOPHIL # BLD AUTO: 0.08 10*3/MM3 (ref 0–0.4)
EOSINOPHIL NFR BLD AUTO: 1.3 % (ref 0.3–6.2)
ERYTHROCYTE [DISTWIDTH] IN BLOOD BY AUTOMATED COUNT: 13.9 % (ref 12.3–15.4)
GLOBULIN UR ELPH-MCNC: 2.7 GM/DL
GLUCOSE SERPL-MCNC: 121 MG/DL (ref 65–99)
GLUCOSE UR STRIP-MCNC: NEGATIVE MG/DL
HCT VFR BLD AUTO: 41.8 % (ref 34–46.6)
HGB BLD-MCNC: 13.6 G/DL (ref 12–15.9)
HGB UR QL STRIP.AUTO: NEGATIVE
HOLD SPECIMEN: NORMAL
HOLD SPECIMEN: NORMAL
IMM GRANULOCYTES # BLD AUTO: 0.01 10*3/MM3 (ref 0–0.05)
IMM GRANULOCYTES NFR BLD AUTO: 0.2 % (ref 0–0.5)
KETONES UR QL STRIP: NEGATIVE
LEUKOCYTE ESTERASE UR QL STRIP.AUTO: NEGATIVE
LIPASE SERPL-CCNC: 18 U/L (ref 13–60)
LYMPHOCYTES # BLD AUTO: 2.16 10*3/MM3 (ref 0.7–3.1)
LYMPHOCYTES NFR BLD AUTO: 35.7 % (ref 19.6–45.3)
MCH RBC QN AUTO: 27.6 PG (ref 26.6–33)
MCHC RBC AUTO-ENTMCNC: 32.5 G/DL (ref 31.5–35.7)
MCV RBC AUTO: 85 FL (ref 79–97)
MONOCYTES # BLD AUTO: 0.4 10*3/MM3 (ref 0.1–0.9)
MONOCYTES NFR BLD AUTO: 6.6 % (ref 5–12)
NEUTROPHILS NFR BLD AUTO: 3.36 10*3/MM3 (ref 1.7–7)
NEUTROPHILS NFR BLD AUTO: 55.5 % (ref 42.7–76)
NITRITE UR QL STRIP: NEGATIVE
NRBC BLD AUTO-RTO: 0 /100 WBC (ref 0–0.2)
PH UR STRIP.AUTO: <=5 [PH] (ref 5–8)
PLATELET # BLD AUTO: 300 10*3/MM3 (ref 140–450)
PMV BLD AUTO: 10.2 FL (ref 6–12)
POTASSIUM SERPL-SCNC: 4.2 MMOL/L (ref 3.5–5.2)
PROT SERPL-MCNC: 7.2 G/DL (ref 6–8.5)
PROT UR QL STRIP: ABNORMAL
RBC # BLD AUTO: 4.92 10*6/MM3 (ref 3.77–5.28)
SODIUM SERPL-SCNC: 139 MMOL/L (ref 136–145)
SP GR UR STRIP: 1.01 (ref 1–1.03)
UROBILINOGEN UR QL STRIP: ABNORMAL
WBC NRBC COR # BLD AUTO: 6.05 10*3/MM3 (ref 3.4–10.8)
WHOLE BLOOD HOLD COAG: NORMAL
WHOLE BLOOD HOLD SPECIMEN: NORMAL

## 2024-04-10 PROCEDURE — 74177 CT ABD & PELVIS W/CONTRAST: CPT

## 2024-04-10 PROCEDURE — 81003 URINALYSIS AUTO W/O SCOPE: CPT | Performed by: EMERGENCY MEDICINE

## 2024-04-10 PROCEDURE — 25510000001 IOPAMIDOL PER 1 ML: Performed by: EMERGENCY MEDICINE

## 2024-04-10 PROCEDURE — 96375 TX/PRO/DX INJ NEW DRUG ADDON: CPT

## 2024-04-10 PROCEDURE — 83605 ASSAY OF LACTIC ACID: CPT

## 2024-04-10 PROCEDURE — 72050 X-RAY EXAM NECK SPINE 4/5VWS: CPT

## 2024-04-10 PROCEDURE — 36415 COLL VENOUS BLD VENIPUNCTURE: CPT

## 2024-04-10 PROCEDURE — 25010000002 DEXAMETHASONE SODIUM PHOSPHATE 10 MG/ML SOLUTION: Performed by: NURSE PRACTITIONER

## 2024-04-10 PROCEDURE — 25010000002 KETOROLAC TROMETHAMINE PER 15 MG: Performed by: NURSE PRACTITIONER

## 2024-04-10 PROCEDURE — 99285 EMERGENCY DEPT VISIT HI MDM: CPT

## 2024-04-10 PROCEDURE — 85025 COMPLETE CBC W/AUTO DIFF WBC: CPT

## 2024-04-10 PROCEDURE — 83690 ASSAY OF LIPASE: CPT

## 2024-04-10 PROCEDURE — 80053 COMPREHEN METABOLIC PANEL: CPT

## 2024-04-10 PROCEDURE — 96374 THER/PROPH/DIAG INJ IV PUSH: CPT

## 2024-04-10 RX ORDER — LIDOCAINE 50 MG/G
1 PATCH TOPICAL EVERY 24 HOURS
Qty: 5 PATCH | Refills: 0 | Status: SHIPPED | OUTPATIENT
Start: 2024-04-10

## 2024-04-10 RX ORDER — ORPHENADRINE CITRATE 100 MG/1
100 TABLET, EXTENDED RELEASE ORAL 2 TIMES DAILY PRN
Qty: 20 TABLET | Refills: 0 | Status: SHIPPED | OUTPATIENT
Start: 2024-04-10

## 2024-04-10 RX ORDER — PREDNISONE 20 MG/1
60 TABLET ORAL DAILY
Qty: 9 TABLET | Refills: 0 | Status: SHIPPED | OUTPATIENT
Start: 2024-04-10 | End: 2024-04-13

## 2024-04-10 RX ORDER — KETOROLAC TROMETHAMINE 30 MG/ML
30 INJECTION, SOLUTION INTRAMUSCULAR; INTRAVENOUS ONCE
Status: COMPLETED | OUTPATIENT
Start: 2024-04-10 | End: 2024-04-10

## 2024-04-10 RX ORDER — SODIUM CHLORIDE 0.9 % (FLUSH) 0.9 %
10 SYRINGE (ML) INJECTION AS NEEDED
Status: DISCONTINUED | OUTPATIENT
Start: 2024-04-10 | End: 2024-04-11 | Stop reason: HOSPADM

## 2024-04-10 RX ORDER — DEXAMETHASONE SODIUM PHOSPHATE 10 MG/ML
10 INJECTION, SOLUTION INTRAMUSCULAR; INTRAVENOUS ONCE
Status: COMPLETED | OUTPATIENT
Start: 2024-04-10 | End: 2024-04-10

## 2024-04-10 RX ORDER — KETOROLAC TROMETHAMINE 10 MG/1
10 TABLET, FILM COATED ORAL EVERY 6 HOURS PRN
Qty: 20 TABLET | Refills: 0 | Status: SHIPPED | OUTPATIENT
Start: 2024-04-10

## 2024-04-10 RX ADMIN — DEXAMETHASONE SODIUM PHOSPHATE 10 MG: 10 INJECTION INTRAMUSCULAR; INTRAVENOUS at 21:13

## 2024-04-10 RX ADMIN — IOPAMIDOL 100 ML: 755 INJECTION, SOLUTION INTRAVENOUS at 21:25

## 2024-04-10 RX ADMIN — KETOROLAC TROMETHAMINE 30 MG: 30 INJECTION, SOLUTION INTRAMUSCULAR; INTRAVENOUS at 21:13

## 2024-04-10 NOTE — ED PROVIDER NOTES
Time: 7:29 PM EDT  Date of encounter:  4/10/2024  Independent Historian/Clinical History and Information was obtained by:   Patient    History is limited by: N/A    Chief Complaint: Left lower quadrant pain      History of Present Illness:  Patient is a 48 y.o. year old female who presents to the emergency department for evaluation of left lower quadrant pain intermittent x 1 month and worsening.  Patient also has left lateral neck pain that hurts with movement.  Denies fall or injury.  Denies fever, chills, nausea, vomiting, dysuria or hematuria    HPI    Patient Care Team  Primary Care Provider: Betsy Muro APRN    Past Medical History:     Allergies   Allergen Reactions    Latex Hives and Itching     Past Medical History:   Diagnosis Date    Disease of thyroid gland     Hypothyroid      Past Surgical History:   Procedure Laterality Date    CHOLECYSTECTOMY      ENDOMETRIAL ABLATION      HYSTERECTOMY      KNEE SURGERY      TUBAL ABDOMINAL LIGATION       Family History   Problem Relation Age of Onset    Ulcers Father     Hypertension Mother     Cancer Maternal Grandmother         stomach    Hypertension Maternal Grandmother     Diabetes Maternal Grandmother     Heart disease Maternal Grandmother     Hypertension Maternal Grandfather     Stroke Maternal Grandfather     Heart disease Maternal Grandfather     COPD Maternal Grandfather     Thyroid disease Maternal Aunt     Breast cancer Neg Hx     Colon cancer Neg Hx     Uterine cancer Neg Hx     Ovarian cancer Neg Hx        Home Medications:  Prior to Admission medications    Medication Sig Start Date End Date Taking? Authorizing Provider   albuterol sulfate  (90 Base) MCG/ACT inhaler Every 4 (Four) Hours.    Emergency, Nurse Kunal, RN   cholecalciferol (VITAMIN D3) 1.25 MG (15261 UT) capsule cholecalciferol (vitamin D3) 1,250 mcg (50,000 unit) capsule   Take 1 capsule twice a week by oral route for 30 days.    Provider, MD Lorelei   estradiol (ESTRACE)  "0.1 MG/GM vaginal cream USE 2 GRAMS VAGINALLY AT BEDTIME 2 TIMES EVERY WEEK 2/22/24   Ananya Anna,    ipratropium-albuterol (DUO-NEB) 0.5-2.5 mg/3 ml nebulizer ipratropium 0.5 mg-albuterol 3 mg (2.5 mg base)/3 mL nebulization soln    Emergency, Nurse Kunal, RN   levothyroxine (SYNTHROID, LEVOTHROID) 100 MCG tablet  12/16/22   Provider, MD Lorelei   montelukast (SINGULAIR) 10 MG tablet montelukast 10 mg tablet   TAKE 1 TABLET BY MOUTH EVERY DAY    Emergency, Nurse Kunal, RN   pantoprazole (PROTONIX) 40 MG EC tablet  10/18/21   Emergency, Nurse Kunal, RN        Social History:   Social History     Tobacco Use    Smoking status: Never    Smokeless tobacco: Never   Vaping Use    Vaping status: Never Used   Substance Use Topics    Alcohol use: Yes     Comment: occ    Drug use: Never         Review of Systems:  Review of Systems   Constitutional:  Negative for fever.   Respiratory:  Negative for cough and shortness of breath.    Cardiovascular:  Negative for chest pain.   Gastrointestinal:  Positive for abdominal pain (llq). Negative for diarrhea, nausea and vomiting.   Genitourinary:  Negative for flank pain and pelvic pain.   Musculoskeletal:  Positive for neck pain (left side) and neck stiffness.   Neurological: Negative.    Hematological: Negative.    Psychiatric/Behavioral: Negative.     All other systems reviewed and are negative.       Physical Exam:  /90   Pulse 83   Temp 98 °F (36.7 °C)   Resp 18   Ht 165.1 cm (65\")   Wt 125 kg (275 lb 9.2 oz)   SpO2 100%   BMI 45.86 kg/m²     Physical Exam  Vitals and nursing note reviewed.   HENT:      Head: Normocephalic and atraumatic.      Right Ear: Tympanic membrane, ear canal and external ear normal.      Left Ear: Tympanic membrane, ear canal and external ear normal.      Nose: Nose normal.      Mouth/Throat:      Mouth: Mucous membranes are moist.   Eyes:      Conjunctiva/sclera: Conjunctivae normal.   Cardiovascular:      Rate and Rhythm: Normal " rate and regular rhythm.      Heart sounds: Normal heart sounds.   Pulmonary:      Effort: Pulmonary effort is normal.      Breath sounds: Normal breath sounds.   Abdominal:      General: Bowel sounds are normal. There is no distension.      Palpations: Abdomen is soft.      Tenderness: There is abdominal tenderness (LLQ). There is no right CVA tenderness or left CVA tenderness.   Musculoskeletal:         General: Normal range of motion.      Cervical back: Normal range of motion. Tenderness (left side muscle) present.   Skin:     General: Skin is warm and dry.   Neurological:      Mental Status: She is alert and oriented to person, place, and time.   Psychiatric:         Mood and Affect: Mood normal.         Behavior: Behavior normal.              Medical Decision Making:      Comorbidities that affect care:    Thyroid Disease    External Notes reviewed:    Previous Clinic Note: pt seen by endocrinology last may for thyorid nodule      The following orders were placed and all results were independently analyzed by me:  Orders Placed This Encounter   Procedures    XR Spine Cervical Complete 4 or 5 View    CT Abdomen Pelvis With Contrast    [place] Draw    Comprehensive Metabolic Panel    Lipase    Urinalysis With Microscopic If Indicated (No Culture) - Urine, Clean Catch    Lactic Acid, Plasma    CBC Auto Differential    NPO Diet NPO Type: Strict NPO    Undress & Gown    Insert Peripheral IV    CBC & Differential    Green Top (Gel)    Lavender Top    Gold Top - SST    Light Blue Top       Medications Given in the Emergency Department:  Medications   sodium chloride 0.9 % flush 10 mL (has no administration in time range)   ketorolac (TORADOL) injection 30 mg (30 mg Intravenous Given 4/10/24 2113)   dexAMETHasone sodium phosphate injection 10 mg (10 mg Intravenous Given 4/10/24 2113)   iopamidol (ISOVUE-370) 76 % injection 100 mL (100 mL Intravenous Given 4/10/24 2125)        ED Course:    ED Course as of 04/11/24  0005   Wed Apr 10, 2024   1929 --- PROVIDER IN TRIAGE NOTE ---    The patient was evaluated by me, Marion Santos in triage. Orders were placed and the patient is currently awaiting disposition.    [AJ]   2226 CT Abdomen Pelvis With Contrast  No acute findings [DS]   2241 Reports medication did help.  I have discussed with the patient that the x-ray of the neck is not been read yet.  If there is any positive findings I will call her I have reviewed the x-ray myself and do not see any emergent findings [DS]   u Apr 11, 2024   0000 XR Spine Cervical Complete 4 or 5 View  Deg changes only [DS]      ED Course User Index  [AJ] Marion Santos PA-C  [DS] Reta Lopes, APRN       Labs:    Lab Results (last 24 hours)       Procedure Component Value Units Date/Time    CBC & Differential [[id number]]  (Normal) Collected: 04/10/24 1937    Specimen: Blood from Arm, Left Updated: 04/10/24 1943    Narrative:      The following orders were created for panel order CBC & Differential.  Procedure                               Abnormality         Status                     ---------                               -----------         ------                     CBC Auto Differential[[id number]]        Normal              Final result                 Please view results for these tests on the individual orders.    Comprehensive Metabolic Panel [[id number]]  (Abnormal) Collected: 04/10/24 1937    Specimen: Blood from Arm, Left Updated: 04/10/24 2004     Glucose 121 mg/dL      BUN 8 mg/dL      Creatinine 0.99 mg/dL      Sodium 139 mmol/L      Potassium 4.2 mmol/L      Chloride 102 mmol/L      CO2 24.3 mmol/L      Calcium 9.4 mg/dL      Total Protein 7.2 g/dL      Albumin 4.5 g/dL      ALT (SGPT) 39 U/L      AST (SGOT) 33 U/L      Alkaline Phosphatase 68 U/L      Total Bilirubin 0.5 mg/dL      Globulin 2.7 gm/dL      A/G Ratio 1.7 g/dL      BUN/Creatinine Ratio 8.1     Anion Gap 12.7 mmol/L      eGFR 69.6 mL/min/1.73     Narrative:       GFR Normal >60  Chronic Kidney Disease <60  Kidney Failure <15      Lipase [[id number]]  (Normal) Collected: 04/10/24 1937    Specimen: Blood from Arm, Left Updated: 04/10/24 2004     Lipase 18 U/L     Urinalysis With Microscopic If Indicated (No Culture) - Urine, Clean Catch [[id number]]  (Abnormal) Collected: 04/10/24 1937    Specimen: Urine, Clean Catch Updated: 04/10/24 2104     Color, UA Yellow     Appearance, UA Clear     pH, UA <=5.0     Specific Gravity, UA 1.012     Glucose, UA Negative     Ketones, UA Negative     Bilirubin, UA Negative     Blood, UA Negative     Protein, UA Trace     Leuk Esterase, UA Negative     Nitrite, UA Negative     Urobilinogen, UA 0.2 E.U./dL    Narrative:      Urine microscopic not indicated.    Lactic Acid, Plasma [[id number]]  (Normal) Collected: 04/10/24 1937    Specimen: Blood from Arm, Left Updated: 04/10/24 1959     Lactate 1.4 mmol/L     CBC Auto Differential [[id number]]  (Normal) Collected: 04/10/24 1937    Specimen: Blood from Arm, Left Updated: 04/10/24 1943     WBC 6.05 10*3/mm3      RBC 4.92 10*6/mm3      Hemoglobin 13.6 g/dL      Hematocrit 41.8 %      MCV 85.0 fL      MCH 27.6 pg      MCHC 32.5 g/dL      RDW 13.9 %      RDW-SD 43.1 fl      MPV 10.2 fL      Platelets 300 10*3/mm3      Neutrophil % 55.5 %      Lymphocyte % 35.7 %      Monocyte % 6.6 %      Eosinophil % 1.3 %      Basophil % 0.7 %      Immature Grans % 0.2 %      Neutrophils, Absolute 3.36 10*3/mm3      Lymphocytes, Absolute 2.16 10*3/mm3      Monocytes, Absolute 0.40 10*3/mm3      Eosinophils, Absolute 0.08 10*3/mm3      Basophils, Absolute 0.04 10*3/mm3      Immature Grans, Absolute 0.01 10*3/mm3      nRBC 0.0 /100 WBC              Imaging:    XR Spine Cervical Complete 4 or 5 View    Result Date: 4/10/2024  XR SPINE CERVICAL COMPLETE 4 OR 5 VW-  Date of Exam: 4/10/2024 8:08 PM  Indication: neck pain  Comparison: None available.  Findings: No acute fracture is identified. The C1-2 articulation  appears congruent. The alignment is anatomic. The vertebral body heights appear normal. There are mild discogenic changes with prominent anterior marginal osteophytes at C4-5 through C6-7. There is mild scattered facet arthropathy and uncovertebral hypertrophy. The bony neural foramina appear widely patent throughout. The prevertebral soft tissues are unremarkable.      Impression: 1.  No acute osseous process identified. 2.  Multilevel degenerative changes as described above.   Electronically Signed By-Chidi Chung MD On:4/10/2024 11:45 PM      CT Abdomen Pelvis With Contrast    Result Date: 4/10/2024  CT ABDOMEN PELVIS W CONTRAST-  Date of Exam: 4/10/2024 9:25 PM  Indication: LLQ abdominal pain.  Comparison: None available.  Technique: Axial CT images were obtained of the abdomen and pelvis following the uneventful intravenous administration of 100 mL Isovue-370. Reconstructed coronal and sagittal images were also obtained. Automated exposure control and iterative construction methods were used.   FINDINGS: The lung bases are clear without evidence for focal consolidation or significant pleural effusion.  There is diffuse hepatic fatty infiltration. The liver, spleen, and pancreas are otherwise unremarkable. The patient is status post cholecystectomy. The bilateral adrenal glands are symmetric and unremarkable. Peripelvic cysts are noted involving the kidneys bilaterally. The kidneys are otherwise unremarkable.  There is no bowel dilatation or obstruction. A normal-appearing appendix is observed. There is no evidence for acute appendicitis. No significant free fluid is observed. No abnormal fluid collections are identified. No significant lymphadenopathy is seen throughout the abdomen or pelvis. The bladder is partially decompressed. The celiac and superior mesenteric arterial distributions are opacified without evidence for occlusion.  No acute osseous abnormalities are observed.      1.  No evidence for  acute abnormality throughout the abdomen or pelvis.   Electronically Signed By-Chidi Hawthorne MD On:4/10/2024 10:10 PM         Differential Diagnosis and Discussion:    Abdominal Pain: Based on the patient's signs and symptoms, I considered abdominal aortic aneurysm, small bowel obstruction, pancreatitis, acute cholecystitis, acute appendecitis, peptic ulcer disease, gastritis, colitis, endocrine disorders, irritable bowel syndrome and other differential diagnosis an etiology of the patient's abdominal pain.  Neck Pain: The patient presents with neck pain. My differential diagnosis includes but is not limited to acute spinal epidural abscess, acute spinal epidural bleed, meningitis, musculoskeletal neck pain, spinal fracture, and osteoarthritis.     All labs were reviewed and interpreted by me.  All X-rays impressions were independently interpreted by me.  CT scan radiology impression was interpreted by me.    MDM  Number of Diagnoses or Management Options  Left lower quadrant pain  Neck pain  Diagnosis management comments: The patient is resting comfortably and feels better, is alert and in no distress. Repeat examination is unremarkable and benign; in particular, there's no discomfort at McBurney's point and there is no pulsatile mass. The history, exam, diagnostic testing, and current condition does not suggest acute appendicitis, bowel obstruction, acute cholecystitis, bowel perforation, major gastrointestinal bleeding, severe diverticulitis, abdominal aortic aneurysm, mesenteric ischemia, volvulus, sepsis, or other significant pathology that warrants further testing, continued ED treatment, admission, or surgical evaluation at this point. The vital signs have been stable. The patient does not have uncontrollable pain, intractable vomiting, or other significant symptoms. The patient's condition is stable and appropriate for discharge from the emergency department.        Amount and/or Complexity of Data  Reviewed  Clinical lab tests: reviewed and ordered  Tests in the radiology section of CPT®: reviewed and ordered  Tests in the medicine section of CPT®: ordered and reviewed    Risk of Complications, Morbidity, and/or Mortality  Presenting problems: moderate  Diagnostic procedures: moderate  Management options: low    Patient Progress  Patient progress: stable         Patient Care Considerations:    ANTIBIOTICS: I considered prescribing antibiotics as an outpatient however no bacterial focus of infection was found. NARCOTICS: I considered prescribing opiate pain medication as an outpatient, however no acute findings      Consultants/Shared Management Plan:    None    Social Determinants of Health:    Patient is independent, reliable, and has access to care.       Disposition and Care Coordination:    Discharged: I considered escalation of care by admitting this patient to the hospital, however all testing negative and patient improved    I have explained the patient´s condition, diagnoses and treatment plan based on the information available to me at this time. I have answered questions and addressed any concerns. The patient has a good  understanding of the patient´s diagnosis, condition, and treatment plan as can be expected at this point. The vital signs have been stable. The patient´s condition is stable and appropriate for discharge from the emergency department.      The patient will pursue further outpatient evaluation with the primary care physician or other designated or consulting physician as outlined in the discharge instructions. They are agreeable to this plan of care and follow-up instructions have been explained in detail. The patient has received these instructions in written format and has expressed an understanding of the discharge instructions. The patient is aware that any significant change in condition or worsening of symptoms should prompt an immediate return to this or the closest emergency  department or call to 911.  I have explained discharge medications and the need for follow up with the patient/caretakers. This was also printed in the discharge instructions. Patient was discharged with the following medications and follow up:      Medication List        New Prescriptions      ketorolac 10 MG tablet  Commonly known as: TORADOL  Take 1 tablet by mouth Every 6 (Six) Hours As Needed for Moderate Pain or Mild Pain.     lidocaine 5 %  Commonly known as: LIDODERM  Place 1 patch on the skin as directed by provider Daily. Remove after 12 hours.     orphenadrine 100 MG 12 hr tablet  Commonly known as: NORFLEX  Take 1 tablet by mouth 2 (Two) Times a Day As Needed for Mild Pain or Muscle Spasms.     predniSONE 20 MG tablet  Commonly known as: DELTASONE  Take 3 tablets by mouth Daily for 3 days.               Where to Get Your Medications        These medications were sent to [id number] - [place], [place] - [address] AT [facility] OF [address] & [place] - [phone]  - [phone]   [address] [place] [place] [address]      Phone: [phone]   ketorolac 10 MG tablet  lidocaine 5 %  orphenadrine 100 MG 12 hr tablet  predniSONE 20 MG tablet      Betsy Muro, APRN  [address] [facility] [address] [phone]    Schedule an appointment as soon as possible for a visit          Final diagnoses:   Neck pain   Left lower quadrant pain        ED Disposition       ED Disposition   Discharge    Condition   Stable    Comment   --               This medical record created using voice recognition software.             Reta Lopes APRN  04/11/24 0005

## 2024-04-11 NOTE — DISCHARGE INSTRUCTIONS
CT scan and lab work were unremarkable and did not show any emergent findings.    No known cause for your abdominal pain is readily apparent.  Take medication as prescribed for symptomatic relief.    Follow-up with your PCP and OB/GYN for further evaluation.    Neck CT did not show anything emergent.  From the history of this injury it sounds like you have strained the muscles.    Take medication as prescribed for relief and follow-up with your PCP if no better

## 2024-05-10 ENCOUNTER — OFFICE VISIT (OUTPATIENT)
Dept: OBSTETRICS AND GYNECOLOGY | Facility: CLINIC | Age: 49
End: 2024-05-10
Payer: COMMERCIAL

## 2024-05-10 VITALS
BODY MASS INDEX: 45.26 KG/M2 | WEIGHT: 272 LBS | SYSTOLIC BLOOD PRESSURE: 126 MMHG | DIASTOLIC BLOOD PRESSURE: 83 MMHG | HEART RATE: 80 BPM

## 2024-05-10 DIAGNOSIS — Z12.11 ENCOUNTER FOR SCREENING COLONOSCOPY: ICD-10-CM

## 2024-05-10 DIAGNOSIS — Z01.419 WELL WOMAN EXAM WITH ROUTINE GYNECOLOGICAL EXAM: Primary | ICD-10-CM

## 2024-05-10 RX ORDER — CHLORCYCLIZINE HYDROCHLORIDE AND PSEUDOEPHEDRINE HYDROCHLORIDE 25; 60 MG/1; MG/1
TABLET ORAL
COMMUNITY
Start: 2024-04-24 | End: 2024-05-10

## 2024-05-13 ENCOUNTER — TELEPHONE (OUTPATIENT)
Dept: GASTROENTEROLOGY | Facility: CLINIC | Age: 49
End: 2024-05-13
Payer: COMMERCIAL

## 2024-05-13 NOTE — TELEPHONE ENCOUNTER
Left a voice message for patient to return call in reference to a referral we received from Ananya Anna for a colon screening. Patient needs a phone screening appointment. Will defer referral for two days to give patient time to return call.

## 2024-05-15 NOTE — TELEPHONE ENCOUNTER
Left a voice message for patient to return call in reference to a referral we received from Ananya Anna for a colon screening. Patient needs a phone screening appointment. This is the second attempt in trying to contact patient. Will defer referral for two days to give patient time to return call.

## 2024-05-17 ENCOUNTER — TELEPHONE (OUTPATIENT)
Dept: GASTROENTEROLOGY | Facility: CLINIC | Age: 49
End: 2024-05-17
Payer: COMMERCIAL

## 2024-05-17 NOTE — TELEPHONE ENCOUNTER
Left a voice message for patient to return call in reference to a referral we received from Ananya Anna for a colon screening. Patient needs a phone screening appointment. This is the third attempt in trying to contact patient. Will defer referral for two days to give patient time to return call.

## 2024-06-13 ENCOUNTER — HOSPITAL ENCOUNTER (OUTPATIENT)
Dept: MAMMOGRAPHY | Facility: HOSPITAL | Age: 49
Discharge: HOME | End: 2024-06-13
Admitting: OBSTETRICS & GYNECOLOGY
Payer: COMMERCIAL

## 2024-06-13 DIAGNOSIS — Z01.419 WELL WOMAN EXAM WITH ROUTINE GYNECOLOGICAL EXAM: ICD-10-CM

## 2024-06-13 PROCEDURE — 77063 BREAST TOMOSYNTHESIS BI: CPT

## 2024-06-13 PROCEDURE — 77067 SCR MAMMO BI INCL CAD: CPT

## 2024-07-11 ENCOUNTER — OFFICE VISIT (OUTPATIENT)
Dept: GASTROENTEROLOGY | Facility: CLINIC | Age: 49
End: 2024-07-11
Payer: COMMERCIAL

## 2024-07-11 VITALS
HEART RATE: 70 BPM | WEIGHT: 272.4 LBS | DIASTOLIC BLOOD PRESSURE: 73 MMHG | BODY MASS INDEX: 45.38 KG/M2 | HEIGHT: 65 IN | SYSTOLIC BLOOD PRESSURE: 135 MMHG

## 2024-07-11 DIAGNOSIS — R14.0 ABDOMINAL BLOATING: ICD-10-CM

## 2024-07-11 DIAGNOSIS — K62.5 RECTAL BLEEDING: Primary | ICD-10-CM

## 2024-07-11 DIAGNOSIS — R19.8 IRREGULAR BOWEL HABITS: ICD-10-CM

## 2024-07-11 DIAGNOSIS — R10.84 GENERALIZED ABDOMINAL PAIN: ICD-10-CM

## 2024-07-11 RX ORDER — PANTOPRAZOLE SODIUM 40 MG/1
40 TABLET, DELAYED RELEASE ORAL DAILY
Qty: 90 TABLET | Refills: 1 | Status: SHIPPED | OUTPATIENT
Start: 2024-07-11

## 2024-07-11 RX ORDER — SODIUM, POTASSIUM,MAG SULFATES 17.5-3.13G
2 SOLUTION, RECONSTITUTED, ORAL ORAL TAKE AS DIRECTED
Qty: 177 ML | Refills: 0 | Status: SHIPPED | OUTPATIENT
Start: 2024-07-11

## 2024-07-11 NOTE — PROGRESS NOTES
Chief Complaint     incomplete bowel movements, Constipation, and Diarrhea    History of Present Illness     Javier Lua is a 48 y.o. female who presents to [facility] on referral from Ananya Anna DO for a gastroenterology evaluation of irregular bowel pattern.      She reports an irregular bowel pattern. Having a daily bowel movement.  Stool varies from #6 and #4.  She's unsure of bowel pattern is related to certain foods.  Reports noticing blood in stool in May. This is not present daily.       Admits abdominal bloating that was previously improved when she was taking zantac.      She is taking Protonix every other day.  Feels like when she takes it, her stomach feels better.  She has discomfort in epigastric, mid abdomen area.       History      Past Medical History:   Diagnosis Date    Abnormal Pap smear of cervix     Disease of thyroid gland     Hypothyroid        Past Surgical History:   Procedure Laterality Date    CHOLECYSTECTOMY  12/24/1998    COLONOSCOPY      ENDOMETRIAL ABLATION      HYSTERECTOMY      KNEE SURGERY Right 2017    TUBAL ABDOMINAL LIGATION      UPPER GASTROINTESTINAL ENDOSCOPY         Family History   Problem Relation Age of Onset    Ulcers Father     Hypertension Mother     Hypertension Maternal Grandmother     Diabetes Maternal Grandmother     Heart disease Maternal Grandmother     Stomach cancer Maternal Grandmother     Hypertension Maternal Grandfather     Stroke Maternal Grandfather     Heart disease Maternal Grandfather     COPD Maternal Grandfather     Thyroid disease Maternal Aunt     Breast cancer Neg Hx     Colon cancer Neg Hx     Uterine cancer Neg Hx     Ovarian cancer Neg Hx     Deep vein thrombosis Neg Hx     Pulmonary embolism Neg Hx         Current Medications        Current Outpatient Medications:     albuterol sulfate  (90 Base) MCG/ACT inhaler, Every 4 (Four) Hours., Disp: , Rfl:     cholecalciferol (VITAMIN D3) 1.25 MG  "(62768 UT) capsule, cholecalciferol (vitamin D3) 1,250 mcg (50,000 unit) capsule  Take 1 capsule twice a week by oral route for 30 days., Disp: , Rfl:     ipratropium-albuterol (DUO-NEB) 0.5-2.5 mg/3 ml nebulizer, ipratropium 0.5 mg-albuterol 3 mg (2.5 mg base)/3 mL nebulization soln, Disp: , Rfl:     levothyroxine (SYNTHROID, LEVOTHROID) 100 MCG tablet, , Disp: , Rfl:     montelukast (SINGULAIR) 10 MG tablet, montelukast 10 mg tablet  TAKE 1 TABLET BY MOUTH EVERY DAY, Disp: , Rfl:     pantoprazole (PROTONIX) 40 MG EC tablet, , Disp: , Rfl:     sodium-potassium-magnesium sulfates (Suprep Bowel Prep Kit) 17.5-3.13-1.6 GM/177ML solution oral solution, Take 2 bottles by mouth Take As Directed., Disp: 177 mL, Rfl: 0     Allergies     Allergies   Allergen Reactions    Latex Hives and Itching       Social History       Social History     Social History Narrative    Not on file       Immunizations     Immunization:  Immunization History   Administered Date(s) Administered    COVID-19 (PFIZER) Purple Cap Monovalent 04/05/2021, 05/03/2021    COVID-19 (UNSPECIFIED) 04/05/2021, 05/03/2021    Flu Vaccine Split Quad 10/15/2017, 10/10/2018    Fluzone Quad >6mos (Multi-dose) 10/25/2021    Hepatitis A 05/25/2018, 01/08/2019          Objective     Objective     Vital Signs:   /73 (BP Location: Left arm, Patient Position: Sitting, Cuff Size: Adult)   Pulse 70   Ht 165.1 cm (65\")   Wt 124 kg (272 lb 6.4 oz)   BMI 45.33 kg/m²       Physical Exam    Results      Result Review :   The following data was reviewed by: SCOTT Mcguire on 07/11/2024:    4/10/2024 CT abdomen pelvis with contrast-diffuse hepatic steatosis.  Previous cholecystectomy.  No bowel dilatation or obstruction.    12/18/2018 colonoscopy-a few erosions noted in the terminal ileum, biopsy consistent with focal active ileitis with mucosal erosion. Normal mucosa noted in whole colon. Grade 1 internal hemorrhoids.      CBC w/diff          4/10/2024    " 19:37   CBC w/Diff   WBC 6.05    RBC 4.92    Hemoglobin 13.6    Hematocrit 41.8    MCV 85.0    MCH 27.6    MCHC 32.5    RDW 13.9    Platelets 300    Neutrophil Rel % 55.5    Immature Granulocyte Rel % 0.2    Lymphocyte Rel % 35.7    Monocyte Rel % 6.6    Eosinophil Rel % 1.3    Basophil Rel % 0.7      CMP          4/10/2024    19:37   CMP   Glucose 121    BUN 8    Creatinine 0.99    EGFR 69.6    Sodium 139    Potassium 4.2    Chloride 102    Calcium 9.4    Total Protein 7.2    Albumin 4.5    Globulin 2.7    Total Bilirubin 0.5    Alkaline Phosphatase 68    AST (SGOT) 33    ALT (SGPT) 39    Albumin/Globulin Ratio 1.7    BUN/Creatinine Ratio 8.1    Anion Gap 12.7        Lipase   Lipase   Date Value Ref Range Status   04/10/2024 18 13 - 60 U/L Final          Assessment and Plan        Assessment and Plan    Diagnoses and all orders for this visit:    1. Rectal bleeding (Primary)  -     Case Request; Standing  -     Case Request    2. Irregular bowel habits  -     Case Request; Standing  -     Case Request    3. Generalized abdominal pain  -     Case Request; Standing  -     Case Request    4. Abdominal bloating    Other orders  -     Follow Anesthesia Guidelines / Protocol; Future  -     Obtain Informed Consent; Future  -     Verify NPO; Standing  -     Verify Bowel Prep Was Successful; Standing  -     Give Tap Water Enema If Bowel Prep Insufficient; Standing  -     Obtain Informed Consent; Standing  -     sodium-potassium-magnesium sulfates (Suprep Bowel Prep Kit) 17.5-3.13-1.6 GM/177ML solution oral solution; Take 2 bottles by mouth Take As Directed.  Dispense: 177 mL; Refill: 0        ESOPHAGOGASTRODUODENOSCOPY (N/A), COLONOSCOPY (N/A)  The risk of the endoscopy were discussed in detail. Possible risks/complications, benefits, and alternatives to surgical or invasive procedure have been explained to patient and/or legal guardian; risks include bleeding, infection, and perforation. Patient has been evaluated and can  tolerate anesthesia and/or sedation.       Follow Up        Follow Up   Return in about 6 months (around 1/11/2025) for abdominal pain and irregular bowel pattern.  Patient was given instructions and counseling regarding her condition or for health maintenance advice. Please see specific information pulled into the AVS if appropriate.

## 2024-07-31 ENCOUNTER — TELEPHONE (OUTPATIENT)
Dept: GASTROENTEROLOGY | Facility: CLINIC | Age: 49
End: 2024-07-31
Payer: COMMERCIAL

## 2024-07-31 NOTE — TELEPHONE ENCOUNTER
Spoke to patient and her procedure has been moved to 8/23/24 @ 12:30 pm. Spoke to Patricia in OR scheduling.

## 2024-07-31 NOTE — TELEPHONE ENCOUNTER
Javier Lua  1975    Patient requested to Reschedule their EGD & Colonoscopy.     Reason for cancelling/rescheduling: patient called the [facility] and requested to reschedule her upcoming procedures, [facility] was advised to let the patient know that I would be sending a message to the clinical pool for an MA to call the patient back.    This procedure was ordered by SCOTT Don for an important reason. We want to inform you that there are risks associated with not proceeding with the procedure at this time such as a delay in diagnosis, risk of incurable disease, or cancer.    Patient plans to call us back to reschedule: No, [facility] was advised to let the patient know that I would be sending a message to the clinical pool for an MA to call the patient back.    Updated clearance needed?: Yes/No    If yes, clearance request has been submitted to: Provider Name    Is the patient currently on any injectable medications for weight loss or diabetes? Yes/No    If yes, are they aware that they will need to discontinue this 7 days prior to their procedure? Yes/No    Patient verbalized understanding for all of the above information.

## 2024-08-22 ENCOUNTER — ANESTHESIA EVENT (OUTPATIENT)
Dept: GASTROENTEROLOGY | Facility: HOSPITAL | Age: 49
End: 2024-08-22
Payer: COMMERCIAL

## 2024-08-23 ENCOUNTER — ANESTHESIA (OUTPATIENT)
Dept: GASTROENTEROLOGY | Facility: HOSPITAL | Age: 49
End: 2024-08-23
Payer: COMMERCIAL

## 2024-08-23 ENCOUNTER — HOSPITAL ENCOUNTER (OUTPATIENT)
Facility: HOSPITAL | Age: 49
Setting detail: HOSPITAL OUTPATIENT SURGERY
Discharge: HOME | End: 2024-08-23
Attending: INTERNAL MEDICINE | Admitting: INTERNAL MEDICINE
Payer: COMMERCIAL

## 2024-08-23 VITALS
TEMPERATURE: 96.9 F | WEIGHT: 265.43 LBS | DIASTOLIC BLOOD PRESSURE: 70 MMHG | RESPIRATION RATE: 17 BRPM | BODY MASS INDEX: 44.17 KG/M2 | SYSTOLIC BLOOD PRESSURE: 113 MMHG | OXYGEN SATURATION: 100 % | HEART RATE: 73 BPM

## 2024-08-23 DIAGNOSIS — R10.84 GENERALIZED ABDOMINAL PAIN: ICD-10-CM

## 2024-08-23 DIAGNOSIS — R19.8 IRREGULAR BOWEL HABITS: ICD-10-CM

## 2024-08-23 DIAGNOSIS — K62.5 RECTAL BLEEDING: ICD-10-CM

## 2024-08-23 PROCEDURE — 43239 EGD BIOPSY SINGLE/MULTIPLE: CPT | Performed by: INTERNAL MEDICINE

## 2024-08-23 PROCEDURE — 88305 TISSUE EXAM BY PATHOLOGIST: CPT | Performed by: INTERNAL MEDICINE

## 2024-08-23 PROCEDURE — 25810000003 LACTATED RINGERS PER 1000 ML: Performed by: NURSE ANESTHETIST, CERTIFIED REGISTERED

## 2024-08-23 PROCEDURE — 25010000002 PROPOFOL 10 MG/ML EMULSION: Performed by: NURSE ANESTHETIST, CERTIFIED REGISTERED

## 2024-08-23 PROCEDURE — 45380 COLONOSCOPY AND BIOPSY: CPT | Performed by: INTERNAL MEDICINE

## 2024-08-23 RX ORDER — HYDROCORTISONE ACETATE 25 MG/1
25 SUPPOSITORY RECTAL 2 TIMES DAILY
Qty: 28 SUPPOSITORY | Refills: 0 | Status: SHIPPED | OUTPATIENT
Start: 2024-08-23 | End: 2024-08-27

## 2024-08-23 RX ORDER — PROPOFOL 10 MG/ML
VIAL (ML) INTRAVENOUS AS NEEDED
Status: DISCONTINUED | OUTPATIENT
Start: 2024-08-23 | End: 2024-08-23 | Stop reason: SURG

## 2024-08-23 RX ORDER — SODIUM CHLORIDE, SODIUM LACTATE, POTASSIUM CHLORIDE, CALCIUM CHLORIDE 600; 310; 30; 20 MG/100ML; MG/100ML; MG/100ML; MG/100ML
30 INJECTION, SOLUTION INTRAVENOUS CONTINUOUS
Status: DISCONTINUED | OUTPATIENT
Start: 2024-08-23 | End: 2024-08-23 | Stop reason: HOSPADM

## 2024-08-23 RX ORDER — SODIUM CHLORIDE, SODIUM LACTATE, POTASSIUM CHLORIDE, CALCIUM CHLORIDE 600; 310; 30; 20 MG/100ML; MG/100ML; MG/100ML; MG/100ML
INJECTION, SOLUTION INTRAVENOUS CONTINUOUS PRN
Status: DISCONTINUED | OUTPATIENT
Start: 2024-08-23 | End: 2024-08-23 | Stop reason: SURG

## 2024-08-23 RX ORDER — LIDOCAINE HYDROCHLORIDE 20 MG/ML
INJECTION, SOLUTION EPIDURAL; INFILTRATION; INTRACAUDAL; PERINEURAL AS NEEDED
Status: DISCONTINUED | OUTPATIENT
Start: 2024-08-23 | End: 2024-08-23 | Stop reason: SURG

## 2024-08-23 RX ADMIN — PROPOFOL 333 MCG/KG/MIN: 10 INJECTION, EMULSION INTRAVENOUS at 14:23

## 2024-08-23 RX ADMIN — PROPOFOL 50 MG: 10 INJECTION, EMULSION INTRAVENOUS at 14:23

## 2024-08-23 RX ADMIN — LIDOCAINE HYDROCHLORIDE 100 MG: 20 INJECTION, SOLUTION INTRAVENOUS at 14:23

## 2024-08-23 RX ADMIN — SODIUM CHLORIDE, POTASSIUM CHLORIDE, SODIUM LACTATE AND CALCIUM CHLORIDE 30 ML/HR: 600; 310; 30; 20 INJECTION, SOLUTION INTRAVENOUS at 12:50

## 2024-08-23 RX ADMIN — SODIUM CHLORIDE, POTASSIUM CHLORIDE, SODIUM LACTATE AND CALCIUM CHLORIDE: 600; 310; 30; 20 INJECTION, SOLUTION INTRAVENOUS at 14:20

## 2024-08-23 NOTE — ANESTHESIA POSTPROCEDURE EVALUATION
Patient: Javier Lua    Procedure Summary       Date: 08/23/24 Room / Location: [facility] ENDOSCOPY 1 / [facility] ENDOSCOPY    Anesthesia Start: 1420 Anesthesia Stop: 1454    Procedures:       ESOPHAGOGASTRODUODENOSCOPY with biopsy      COLONOSCOPY with biopsy Diagnosis:       Rectal bleeding      Irregular bowel habits      Generalized abdominal pain      (Rectal bleeding [K62.5])      (Irregular bowel habits [R19.8])      (Generalized abdominal pain [R10.84])    Surgeons: Tami Melchor MD Provider: Kendall Cobb CRNA    Anesthesia Type: general ASA Status: 2            Anesthesia Type: general    Vitals  Vitals Value Taken Time   /70 08/23/24 1508   Temp 36.1 °C (96.9 °F) 08/23/24 1508   Pulse 73 08/23/24 1508   Resp 17 08/23/24 1508   SpO2 100 % 08/23/24 1508           Post Anesthesia Care and Evaluation    Post-procedure mental status: acceptable.  Pain management: satisfactory to patient    Airway patency: patent  Anesthetic complications: No anesthetic complications    Cardiovascular status: acceptable  Respiratory status: acceptable    Comments: Per chart review     Spoke with his partner Maria Esther. She will let Damion know we called. He's not 100% sure he wants to make an appt. She stated that if he is interested he will call back and make a consult appt.

## 2024-08-23 NOTE — H&P
Pre Procedure History & Physical    Chief Complaint:   Generalized abd pain, bloating, irregular bowel habits, rectal bleeding    Subjective     HPI:   49 yo F here for eval of generalized abd pain, bloating, irregular bowel habits, rectal bleeding.    Past Medical History:   Past Medical History:   Diagnosis Date    Abnormal Pap smear of cervix     Diabetes mellitus     gestational    Disease of thyroid gland     Hypothyroid        Past Surgical History:  Past Surgical History:   Procedure Laterality Date    CHOLECYSTECTOMY  12/24/1998    COLONOSCOPY      ENDOMETRIAL ABLATION      HYSTERECTOMY      KNEE SURGERY Right 2017    TUBAL ABDOMINAL LIGATION      UPPER GASTROINTESTINAL ENDOSCOPY         Family History:  Family History   Problem Relation Age of Onset    Ulcers Father     Hypertension Mother     Hypertension Maternal Grandmother     Diabetes Maternal Grandmother     Heart disease Maternal Grandmother     Stomach cancer Maternal Grandmother     Hypertension Maternal Grandfather     Stroke Maternal Grandfather     Heart disease Maternal Grandfather     COPD Maternal Grandfather     Thyroid disease Maternal Aunt     Breast cancer Neg Hx     Colon cancer Neg Hx     Uterine cancer Neg Hx     Ovarian cancer Neg Hx     Deep vein thrombosis Neg Hx     Pulmonary embolism Neg Hx        Social History:   reports that she has never smoked. She has never been exposed to tobacco smoke. She has never used smokeless tobacco. She reports current alcohol use. She reports that she does not use drugs.    Medications:   Medications Prior to Admission   Medication Sig Dispense Refill Last Dose    albuterol sulfate  (90 Base) MCG/ACT inhaler Every 4 (Four) Hours.       cholecalciferol (VITAMIN D3) 1.25 MG (59583 UT) capsule cholecalciferol (vitamin D3) 1,250 mcg (50,000 unit) capsule   Take 1 capsule twice a week by oral route for 30 days.       ipratropium-albuterol (DUO-NEB) 0.5-2.5 mg/3 ml nebulizer ipratropium 0.5  mg-albuterol 3 mg (2.5 mg base)/3 mL nebulization soln       levothyroxine (SYNTHROID, LEVOTHROID) 100 MCG tablet        montelukast (SINGULAIR) 10 MG tablet montelukast 10 mg tablet   TAKE 1 TABLET BY MOUTH EVERY DAY       pantoprazole (PROTONIX) 40 MG EC tablet Take 1 tablet by mouth Daily. 90 tablet 1     sodium-potassium-magnesium sulfates (Suprep Bowel Prep Kit) 17.5-3.13-1.6 GM/177ML solution oral solution Take 2 bottles by mouth Take As Directed. 177 mL 0        Allergies:  Latex    ROS:    Pertinent items are noted in HPI     Objective     Weight 120 kg (265 lb 6.9 oz), not currently breastfeeding.    Physical Exam   Constitutional: Pt is oriented to person, place, and time and well-developed, well-nourished, and in no distress.   Mouth/Throat: Oropharynx is clear and moist.   Neck: Normal range of motion.   Cardiovascular: Normal rate, regular rhythm and normal heart sounds.    Pulmonary/Chest: Effort normal and breath sounds normal.   Abdominal: Soft. Nontender  Skin: Skin is warm and dry.   Psychiatric: Mood, memory, affect and judgment normal.     Assessment & Plan     Diagnosis:  Generalized abd pain, bloating, irregular bowel habits, rectal bleeding    Anticipated Surgical Procedure:  EGD/colonoscopy    The risks, benefits, and alternatives of this procedure have been discussed with the patient or the responsible party- the patient understands and agrees to proceed.

## 2024-08-27 ENCOUNTER — TELEPHONE (OUTPATIENT)
Dept: GASTROENTEROLOGY | Facility: CLINIC | Age: 49
End: 2024-08-27
Payer: COMMERCIAL

## 2024-08-27 LAB
CYTO UR: NORMAL
LAB AP CASE REPORT: NORMAL
LAB AP CLINICAL INFORMATION: NORMAL
PATH REPORT.FINAL DX SPEC: NORMAL
PATH REPORT.GROSS SPEC: NORMAL

## 2024-08-27 RX ORDER — HYDROCORTISONE 25 MG/G
CREAM TOPICAL 2 TIMES DAILY
Qty: 1 EACH | Refills: 0 | Status: SHIPPED | OUTPATIENT
Start: 2024-08-27 | End: 2024-09-10

## 2024-08-27 NOTE — TELEPHONE ENCOUNTER
Pt called and said that medication does not cover anusol but she is nervous to try the cream due to messiness.   Pt also states that she is feeling nauseous when eating after procedure. Please advise.

## 2024-08-27 NOTE — TELEPHONE ENCOUNTER
Pts insurance called and said that anusol is not covered due to not being fda approved.   They are requesting proctosol cream sent to her pharmacy. Please advise.

## 2024-08-29 ENCOUNTER — TELEPHONE (OUTPATIENT)
Dept: GASTROENTEROLOGY | Facility: CLINIC | Age: 49
End: 2024-08-29
Payer: COMMERCIAL

## 2024-08-29 NOTE — TELEPHONE ENCOUNTER
----- Message from Geneva Villanueva sent at 8/29/2024  9:49 AM EDT -----  Gastric and colon biopsies normal.  Please place in recall for repeat colonoscopy in 10 years.  Send letter to patient and PCP.

## 2025-01-14 ENCOUNTER — OFFICE VISIT (OUTPATIENT)
Dept: GASTROENTEROLOGY | Facility: CLINIC | Age: 50
End: 2025-01-14
Payer: COMMERCIAL

## 2025-01-14 VITALS
OXYGEN SATURATION: 100 % | WEIGHT: 256 LBS | HEIGHT: 65 IN | DIASTOLIC BLOOD PRESSURE: 75 MMHG | HEART RATE: 80 BPM | SYSTOLIC BLOOD PRESSURE: 114 MMHG | BODY MASS INDEX: 42.65 KG/M2

## 2025-01-14 DIAGNOSIS — K21.9 GASTROESOPHAGEAL REFLUX DISEASE WITHOUT ESOPHAGITIS: ICD-10-CM

## 2025-01-14 DIAGNOSIS — K64.0 GRADE I HEMORRHOIDS: Primary | ICD-10-CM

## 2025-01-14 PROCEDURE — 99214 OFFICE O/P EST MOD 30 MIN: CPT | Performed by: NURSE PRACTITIONER

## 2025-01-14 RX ORDER — PANTOPRAZOLE SODIUM 40 MG/1
40 TABLET, DELAYED RELEASE ORAL DAILY
Qty: 90 TABLET | Refills: 2 | Status: SHIPPED | OUTPATIENT
Start: 2025-01-14

## 2025-01-14 RX ORDER — HYDROCORTISONE ACETATE 25 MG/1
24 SUPPOSITORY RECTAL 2 TIMES DAILY
Qty: 14 SUPPOSITORY | Refills: 1 | Status: SHIPPED | OUTPATIENT
Start: 2025-01-14

## 2025-01-14 RX ORDER — PANTOPRAZOLE SODIUM 40 MG/1
40 TABLET, DELAYED RELEASE ORAL DAILY
Qty: 90 TABLET | Refills: 1 | OUTPATIENT
Start: 2025-01-14

## 2025-01-14 RX ORDER — SEMAGLUTIDE 0.68 MG/ML
0.5 INJECTION, SOLUTION SUBCUTANEOUS WEEKLY
COMMUNITY
Start: 2025-01-07

## 2025-01-14 NOTE — PROGRESS NOTES
Chief Complaint     Abdominal Pain, irregular bowel pattern, and f/u after EGD & Colonoscopy    History of Present Illness     Javier Lua is a 49 y.o. female who presents to [facility] GASTROENTEROLOGY for follow-up of irregular bowel habits, abdominal pain, abdominal bloating and rectal bleeding.    She is having issues with hemorrhoidal bleeding.  She's having a hard time using the anusol cream and suppositories were not covered by insurance.      Reports that she's having a bowel movement every 1-2 days.  She feels that bowel pattern is improved.      Reflux is controlled with protonix daily.       History      Past Medical History:   Diagnosis Date    Abnormal Pap smear of cervix     Diabetes mellitus     gestational    Disease of thyroid gland     Hypothyroid      Past Surgical History:   Procedure Laterality Date    CHOLECYSTECTOMY  12/24/1998    COLONOSCOPY      COLONOSCOPY N/A 8/23/2024    Procedure: COLONOSCOPY with biopsy;  Surgeon: Tami Melchor MD;  Location: [facility] ENDOSCOPY;  Service: Gastroenterology;  Laterality: N/A;  internal hemorrhoids    ENDOMETRIAL ABLATION      ENDOSCOPY N/A 8/23/2024    Procedure: ESOPHAGOGASTRODUODENOSCOPY with biopsy;  Surgeon: Tami Melchor MD;  Location: [facility] ENDOSCOPY;  Service: Gastroenterology;  Laterality: N/A;  GASTRIC POLYPS    HYSTERECTOMY      KNEE SURGERY Right 2017    TUBAL ABDOMINAL LIGATION      UPPER GASTROINTESTINAL ENDOSCOPY       Family History   Problem Relation Age of Onset    Ulcers Father     Hypertension Mother     Hypertension Maternal Grandmother     Diabetes Maternal Grandmother     Heart disease Maternal Grandmother     Stomach cancer Maternal Grandmother     Hypertension Maternal Grandfather     Stroke Maternal Grandfather     Heart disease Maternal Grandfather     COPD Maternal Grandfather     Thyroid disease Maternal Aunt     Breast cancer Neg Hx     Colon cancer Neg Hx     Uterine cancer Neg Hx      "Ovarian cancer Neg Hx     Deep vein thrombosis Neg Hx     Pulmonary embolism Neg Hx         Current Medications       Current Outpatient Medications:     albuterol sulfate  (90 Base) MCG/ACT inhaler, Every 4 (Four) Hours., Disp: , Rfl:     cholecalciferol (VITAMIN D3) 1.25 MG (72297 UT) capsule, cholecalciferol (vitamin D3) 1,250 mcg (50,000 unit) capsule  Take 1 capsule twice a week by oral route for 30 days., Disp: , Rfl:     ipratropium-albuterol (DUO-NEB) 0.5-2.5 mg/3 ml nebulizer, ipratropium 0.5 mg-albuterol 3 mg (2.5 mg base)/3 mL nebulization soln, Disp: , Rfl:     levothyroxine (SYNTHROID, LEVOTHROID) 100 MCG tablet, , Disp: , Rfl:     montelukast (SINGULAIR) 10 MG tablet, montelukast 10 mg tablet  TAKE 1 TABLET BY MOUTH EVERY DAY, Disp: , Rfl:     Ozempic, 0.25 or 0.5 MG/DOSE, 2 MG/3ML solution pen-injector, Inject 0.5 mg under the skin into the appropriate area as directed 1 (One) Time Per Week., Disp: , Rfl:     pantoprazole (PROTONIX) 40 MG EC tablet, Take 1 tablet by mouth Daily., Disp: 90 tablet, Rfl: 2    hydrocortisone (ANUSOL-HC) 25 MG suppository, Insert 1 suppository into the rectum 2 (Two) Times a Day. Please compound the suppositories for 24 mg suppository., Disp: 14 suppository, Rfl: 1     Allergies     Allergies   Allergen Reactions    Latex Hives and Itching       Social History       Social History     Social History Narrative    Not on file         Objective       /75 (BP Location: Left arm, Patient Position: Sitting)   Pulse 80   Ht 165.1 cm (65\")   Wt 116 kg (256 lb)   SpO2 100%   BMI 42.60 kg/m²       Physical Exam    Results       Result Review :    The following data was reviewed by: SCOTT Mcguire on 01/14/2025 8/23/2004 EGD-the examined esophagus was normal.  The entire examined stomach was normal, biopsy-negative for H. pylori and intestinal metaplasia.  Multiple 4 to 6 mm polyps were found in the gastric fundus and gastric body, biopsy-fundic gland " polyps.  Normal duodenum, biopsy-normal.  Colonoscopy-hemorrhoids found on perianal exam.  Terminal ileum appeared normal.  The entire examined colon appeared normal.  Random colon biopsy-negative for microscopic colitis.  Grade 1 internal hemorrhoids.  Recommend repeat colonoscopy in 10 years for screening purposes.                 Assessment and Plan              Diagnoses and all orders for this visit:    1. Grade I hemorrhoids (Primary)  -     hydrocortisone (ANUSOL-HC) 25 MG suppository; Insert 1 suppository into the rectum 2 (Two) Times a Day. Please compound the suppositories for 24 mg suppository.  Dispense: 14 suppository; Refill: 1    2. Gastroesophageal reflux disease without esophagitis  -     pantoprazole (PROTONIX) 40 MG EC tablet; Take 1 tablet by mouth Daily.  Dispense: 90 tablet; Refill: 2              Follow Up     Follow Up   Return in about 9 months (around 10/14/2025) for GERD and hemorrhoids.  Patient was given instructions and counseling regarding her condition or for health maintenance advice. Please see specific information pulled into the AVS if appropriate.

## 2025-01-14 NOTE — TELEPHONE ENCOUNTER
Pt is requesting a refill of Pantoprazole 40 MG tablet    Last refill: 10/11/2024   Last ov: 7/11/2024   Next ov: today

## 2025-06-02 ENCOUNTER — PREP FOR SURGERY (OUTPATIENT)
Dept: OTHER | Facility: HOSPITAL | Age: 50
End: 2025-06-02
Payer: COMMERCIAL

## 2025-06-02 ENCOUNTER — OFFICE VISIT (OUTPATIENT)
Dept: PLASTIC SURGERY | Facility: CLINIC | Age: 50
End: 2025-06-02
Payer: COMMERCIAL

## 2025-06-02 VITALS
SYSTOLIC BLOOD PRESSURE: 108 MMHG | DIASTOLIC BLOOD PRESSURE: 76 MMHG | HEIGHT: 65 IN | WEIGHT: 256 LBS | HEART RATE: 73 BPM | OXYGEN SATURATION: 96 % | BODY MASS INDEX: 42.65 KG/M2

## 2025-06-02 DIAGNOSIS — N62 MACROMASTIA: Primary | ICD-10-CM

## 2025-06-02 PROCEDURE — 99204 OFFICE O/P NEW MOD 45 MIN: CPT | Performed by: SURGERY

## 2025-06-02 RX ORDER — TRANEXAMIC ACID 10 MG/ML
1000 INJECTION, SOLUTION INTRAVENOUS
OUTPATIENT
Start: 2025-06-02

## 2025-06-02 RX ORDER — TIRZEPATIDE 5 MG/.5ML
INJECTION, SOLUTION SUBCUTANEOUS
COMMUNITY

## 2025-06-02 RX ORDER — SCOPOLAMINE 1 MG/3D
1 PATCH, EXTENDED RELEASE TRANSDERMAL CONTINUOUS
OUTPATIENT
Start: 2025-06-02 | End: 2025-06-05

## 2025-06-02 RX ORDER — ACETAMINOPHEN 500 MG
1000 TABLET ORAL ONCE
OUTPATIENT
Start: 2025-06-02 | End: 2025-06-02

## 2025-06-02 RX ORDER — TIRZEPATIDE 7.5 MG/.5ML
INJECTION, SOLUTION SUBCUTANEOUS
COMMUNITY
Start: 2025-05-29

## 2025-06-02 NOTE — PROGRESS NOTES
"Chief Complaint  Consult ([facility])    Subjective          History of Present Illness  Javier Lua is a 49 y.o. female who presents to [facility] as a consult from SCOTT Metcalf and would like to discuss breast reduction.  Her current bra size is a 44, DDDD/G cup.  She would like to be a unsure cup.  She patient does have a personal or family history of breast cancer.  Neck, shoulders, and upper back pain present for more than 20 years.  Conservative treatments of chiropractor 1xmonth, with little or temporary relief.  Experiences rashes, treats with baby powder and creams.  Trouble sleeping, cannot get comfortable.  Trouble exercising, cannot do activities that she would like to do, generally has to wear many sports bras and has to special order bras.  Recommendations for today of breast reduction.  Recommendations for weight loss in order to lower the Schur requirement.     Mammo 6/13/24    Allergies: Latex  Allergies Reconciled.    Review of Systems  All system were reviewed and were negative, except the ones noted above.     Review of Systems     Objective     /76 (BP Location: Right arm, Patient Position: Sitting, Cuff Size: Adult)   Pulse 73   Ht 165.1 cm (65\")   Wt 116 kg (256 lb)   SpO2 96%   BMI 42.60 kg/m²     Body mass index is 42.6 kg/m².         A1C 6    Physical Exam  Breasts are very heavy. The inframammary fold to the right nipple is 18 on the right and 19 on the left. The base on the right breast is 24 and on the left is 54.      Physical exam:  Patient awake, alert, oriented  Respirations are non elaborated  Patient is not tachycardic    Breast exam:   Masses: No masses  Nipple Discharge: No nipple discharge  Breast Symmetry:  Axillary Lymphadenopathy: No axillary lymphadenopathy  SN-N (Right Breast): 40  SN-N (Left Breast):40  SN-IMF (Right Breast):18  SN-IMF (Left Breast):19  N-IMF (Right Breast):24  N-IMF (Left " Breast):25    Schnur Scale Score: 895  Body surface area is 2.2 meters squared.      Result Review :                Assessment and Plan      Assessment & Plan  1. Macromastia.  A comprehensive discussion was held regarding the potential risks and benefits associated with the procedure. The plan is to proceed with a bilateral breast reduction using spider mesh, with an intention to excise over 1000 g from each breast. Drains will be placed due to the large reduction and will be removed within 1 to 2 weeks post-surgery. The patient was informed about the possibility of wound problems and suture issues, which are common in large reductions, and that these will be addressed as needed. She was advised to avoid smoking and exposure to smokers to ensure optimal healing. A mammogram will be ordered prior to the surgery, as the last one was conducted in June of last year. The surgery is expected to take place after June. She was informed that the healing process would take approximately 6 weeks, during which she should avoid strenuous activities involving her arms to prevent scar complications. She can return to work in 2 weeks post-surgery.      Specifically for this patient, I recommend inferior medial pedicle breast reduction due to her sternal notch to nipple distance. I would like to avoid nipple grafts. I explained that I will remove her upper medial and lateral breast and this might actually affect her breastfeeding capability.   Because I will use the inferior medial pedicle, I would like to use biological mesh to hold the breast tissue avoiding nipple depression and bottoming up deformity, which is a very common deformity associated with this technique. This will also give patient a better and longer standing shape results.   I request the use of intra op indocyanine dye in order to be able to identify nipple necrosis and  areas of hypoperfusion, allowing me to resect those in the OR avoiding clinical post op nipple  and fat necrosis. If the nipple is hypoperfused, her surgery will be converted to free nipple grafts.     Consent: bilateral breast reduction with use of spy and mesh, possible nipple graft    OR time: 3 hours under general anesthesia    OR supplies:      Mesh:    Mesh:  alloderm rectangle 16x20 perforated [id number] x2       OR time: 3 hours     Vida Kuhn MD, PhD  NPI: [id number]    CPT:  48242-97- breast reduction  37440-59- use of biological mesh  18845 use of spy    Follow Up     No follow-ups on file.    Patient was given instructions and counseling regarding her condition. Please see specific information pulled into the AVS if appropriate.     Vida Kuhn MD  06/02/2025    Patient or patient representative verbalized consent for the use of Ambient Listening during the visit with  Vida Kuhn MD for chart documentation. 6/2/2025  12:15 EDT

## 2025-06-04 ENCOUNTER — TRANSCRIBE ORDERS (OUTPATIENT)
Dept: ADMINISTRATIVE | Facility: HOSPITAL | Age: 50
End: 2025-06-04
Payer: COMMERCIAL

## 2025-06-04 DIAGNOSIS — Z12.31 SCREENING MAMMOGRAM FOR BREAST CANCER: Primary | ICD-10-CM

## 2025-06-18 ENCOUNTER — HOSPITAL ENCOUNTER (OUTPATIENT)
Dept: MAMMOGRAPHY | Facility: HOSPITAL | Age: 50
Discharge: HOME | End: 2025-06-18
Admitting: NURSE PRACTITIONER
Payer: COMMERCIAL

## 2025-06-18 DIAGNOSIS — Z12.31 SCREENING MAMMOGRAM FOR BREAST CANCER: ICD-10-CM

## 2025-06-18 PROCEDURE — 77063 BREAST TOMOSYNTHESIS BI: CPT

## 2025-06-18 PROCEDURE — 77067 SCR MAMMO BI INCL CAD: CPT

## 2025-07-15 ENCOUNTER — TELEPHONE (OUTPATIENT)
Dept: PLASTIC SURGERY | Facility: CLINIC | Age: 50
End: 2025-07-15
Payer: COMMERCIAL

## 2025-07-15 NOTE — TELEPHONE ENCOUNTER
LM advising pt she would need to contact PCP for mammogram results or check mychart for results. Will advise  mammo has been completed.

## 2025-08-28 ENCOUNTER — OFFICE VISIT (OUTPATIENT)
Dept: OBSTETRICS AND GYNECOLOGY | Age: 50
End: 2025-08-28
Payer: COMMERCIAL

## 2025-08-28 VITALS
HEIGHT: 65 IN | SYSTOLIC BLOOD PRESSURE: 110 MMHG | WEIGHT: 248 LBS | DIASTOLIC BLOOD PRESSURE: 74 MMHG | HEART RATE: 79 BPM | BODY MASS INDEX: 41.32 KG/M2

## 2025-08-28 DIAGNOSIS — Z01.419 WELL WOMAN EXAM WITH ROUTINE GYNECOLOGICAL EXAM: Primary | ICD-10-CM

## 2025-08-28 PROCEDURE — 99396 PREV VISIT EST AGE 40-64: CPT | Performed by: OBSTETRICS & GYNECOLOGY

## 2025-08-28 RX ORDER — CELECOXIB 200 MG/1
200 CAPSULE ORAL DAILY
COMMUNITY

## (undated) DEVICE — SINGLE-USE BIOPSY FORCEPS: Brand: RADIAL JAW 4

## (undated) DEVICE — CONN JET HYDRA H20 AUXILIARY DISP

## (undated) DEVICE — SOL IRRG H2O PL/BG 1000ML STRL

## (undated) DEVICE — LINER SURG CANSTR SXN S/RIGD 1500CC

## (undated) DEVICE — SOLIDIFIER LIQLOC PLS 1500CC BT

## (undated) DEVICE — Device: Brand: DEFENDO AIR/WATER/SUCTION AND BIOPSY VALVE

## (undated) DEVICE — Device

## (undated) DEVICE — BLCK/BITE BLOX WO/DENTL/RIM W/STRAP 54F